# Patient Record
Sex: FEMALE | Race: WHITE | NOT HISPANIC OR LATINO | Employment: OTHER | URBAN - METROPOLITAN AREA
[De-identification: names, ages, dates, MRNs, and addresses within clinical notes are randomized per-mention and may not be internally consistent; named-entity substitution may affect disease eponyms.]

---

## 2018-09-21 ENCOUNTER — HOSPITAL ENCOUNTER (OUTPATIENT)
Dept: FAMILY MEDICINE CLINIC | Facility: CLINIC | Age: 73
Setting detail: SPECIMEN
Discharge: HOME | End: 2018-09-21
Attending: FAMILY MEDICINE | Admitting: FAMILY MEDICINE

## 2018-09-21 LAB
ALBUMIN SERPL-MCNC: 3.4 G/DL (ref 3.5–4.8)
ALBUMIN/GLOB SERPL: 1 {RATIO} (ref 1–1.7)
ALP SERPL-CCNC: 82 IU/L (ref 32–91)
ALT SERPL-CCNC: 8 IU/L (ref 14–54)
ANION GAP SERPL CALC-SCNC: 16 MMOL/L (ref 10–20)
AST SERPL-CCNC: 17 IU/L (ref 15–41)
BASOPHILS # BLD AUTO: 0 10*3/UL (ref 0–0.2)
BASOPHILS NFR BLD AUTO: 1 % (ref 0–2)
BILIRUB SERPL-MCNC: 0.7 MG/DL (ref 0.3–1.2)
BUN SERPL-MCNC: 24 MG/DL (ref 8–20)
BUN/CREAT SERPL: 17.1 (ref 5.4–26.2)
CALCIUM SERPL-MCNC: 9.8 MG/DL (ref 8.9–10.3)
CHLORIDE SERPL-SCNC: 95 MMOL/L (ref 101–111)
CHOLEST SERPL-MCNC: 207 MG/DL
CHOLEST/HDLC SERPL: 2.7 {RATIO}
CONV CO2: 23 MMOL/L (ref 22–32)
CONV LDL CHOLESTEROL DIRECT: 92 MG/DL (ref 0–100)
CONV TOTAL PROTEIN: 6.8 G/DL (ref 6.1–7.9)
CREAT UR-MCNC: 1.4 MG/DL (ref 0.4–1)
DIFFERENTIAL METHOD BLD: (no result)
EOSINOPHIL # BLD AUTO: 0.1 10*3/UL (ref 0–0.3)
EOSINOPHIL # BLD AUTO: 2 % (ref 0–3)
ERYTHROCYTE [DISTWIDTH] IN BLOOD BY AUTOMATED COUNT: 13.3 % (ref 11.5–14.5)
GLOBULIN UR ELPH-MCNC: 3.4 G/DL (ref 2.5–3.8)
GLUCOSE SERPL-MCNC: 93 MG/DL (ref 65–99)
HCT VFR BLD AUTO: 38 % (ref 35–49)
HDLC SERPL-MCNC: 78 MG/DL
HGB BLD-MCNC: 12.6 G/DL (ref 12–15)
LDLC/HDLC SERPL: 1.2 {RATIO}
LIPID INTERPRETATION: ABNORMAL
LYMPHOCYTES # BLD AUTO: 1.7 10*3/UL (ref 0.8–4.8)
LYMPHOCYTES NFR BLD AUTO: 25 % (ref 18–42)
MCH RBC QN AUTO: 29.6 PG (ref 26–32)
MCHC RBC AUTO-ENTMCNC: 33.2 G/DL (ref 32–36)
MCV RBC AUTO: 89.2 FL (ref 80–94)
MONOCYTES # BLD AUTO: 0.6 10*3/UL (ref 0.1–1.3)
MONOCYTES NFR BLD AUTO: 8 % (ref 2–11)
NEUTROPHILS # BLD AUTO: 4.6 10*3/UL (ref 2.3–8.6)
NEUTROPHILS NFR BLD AUTO: 64 % (ref 50–75)
NRBC BLD AUTO-RTO: 0 /100{WBCS}
NRBC/RBC NFR BLD MANUAL: 0 10*3/UL
PLATELET # BLD AUTO: 282 10*3/UL (ref 150–450)
PMV BLD AUTO: 7.3 FL (ref 7.4–10.4)
POTASSIUM SERPL-SCNC: 4 MMOL/L (ref 3.6–5.1)
RBC # BLD AUTO: 4.26 10*6/UL (ref 4–5.4)
SODIUM SERPL-SCNC: 130 MMOL/L (ref 136–144)
TRIGL SERPL-MCNC: 230 MG/DL
VLDLC SERPL CALC-MCNC: 37.7 MG/DL
WBC # BLD AUTO: 7.1 10*3/UL (ref 4.5–11.5)

## 2018-12-04 ENCOUNTER — HOSPITAL ENCOUNTER (OUTPATIENT)
Dept: FAMILY MEDICINE CLINIC | Facility: CLINIC | Age: 73
Setting detail: SPECIMEN
Discharge: HOME | End: 2018-12-04
Attending: FAMILY MEDICINE | Admitting: FAMILY MEDICINE

## 2018-12-04 LAB
ANION GAP SERPL CALC-SCNC: 12.5 MMOL/L (ref 10–20)
BACTERIA SPEC AEROBE CULT: NORMAL
BUN SERPL-MCNC: 17 MG/DL (ref 8–20)
BUN/CREAT SERPL: 15.5 (ref 5.4–26.2)
CALCIUM SERPL-MCNC: 9.3 MG/DL (ref 8.9–10.3)
CHLORIDE SERPL-SCNC: 97 MMOL/L (ref 101–111)
COLONY COUNT: NORMAL
CONV CO2: 24 MMOL/L (ref 22–32)
CREAT UR-MCNC: 1.1 MG/DL (ref 0.4–1)
GLUCOSE SERPL-MCNC: 95 MG/DL (ref 65–99)
Lab: NORMAL
MICRO REPORT STATUS: NORMAL
POTASSIUM SERPL-SCNC: 3.5 MMOL/L (ref 3.6–5.1)
SODIUM SERPL-SCNC: 130 MMOL/L (ref 136–144)
SPECIMEN SOURCE: NORMAL

## 2022-02-15 ENCOUNTER — OFFICE (AMBULATORY)
Dept: URBAN - METROPOLITAN AREA PATHOLOGY 4 | Facility: PATHOLOGY | Age: 77
End: 2022-02-15

## 2022-02-15 ENCOUNTER — ON CAMPUS - OUTPATIENT (AMBULATORY)
Dept: URBAN - METROPOLITAN AREA HOSPITAL 2 | Facility: HOSPITAL | Age: 77
End: 2022-02-15

## 2022-02-15 ENCOUNTER — OFFICE (AMBULATORY)
Dept: URBAN - METROPOLITAN AREA PATHOLOGY 4 | Facility: PATHOLOGY | Age: 77
End: 2022-02-15
Payer: COMMERCIAL

## 2022-02-15 VITALS
DIASTOLIC BLOOD PRESSURE: 71 MMHG | DIASTOLIC BLOOD PRESSURE: 86 MMHG | SYSTOLIC BLOOD PRESSURE: 137 MMHG | SYSTOLIC BLOOD PRESSURE: 148 MMHG | TEMPERATURE: 97.6 F | WEIGHT: 155 LBS | DIASTOLIC BLOOD PRESSURE: 104 MMHG | SYSTOLIC BLOOD PRESSURE: 154 MMHG | HEART RATE: 93 BPM | SYSTOLIC BLOOD PRESSURE: 152 MMHG | DIASTOLIC BLOOD PRESSURE: 94 MMHG | HEART RATE: 81 BPM | SYSTOLIC BLOOD PRESSURE: 161 MMHG | HEIGHT: 66 IN | SYSTOLIC BLOOD PRESSURE: 135 MMHG | DIASTOLIC BLOOD PRESSURE: 67 MMHG | OXYGEN SATURATION: 100 % | HEART RATE: 78 BPM | HEART RATE: 76 BPM | RESPIRATION RATE: 15 BRPM | HEART RATE: 71 BPM | DIASTOLIC BLOOD PRESSURE: 83 MMHG | RESPIRATION RATE: 14 BRPM | HEART RATE: 66 BPM | RESPIRATION RATE: 16 BRPM | SYSTOLIC BLOOD PRESSURE: 156 MMHG | DIASTOLIC BLOOD PRESSURE: 95 MMHG | RESPIRATION RATE: 18 BRPM | DIASTOLIC BLOOD PRESSURE: 66 MMHG | OXYGEN SATURATION: 99 % | SYSTOLIC BLOOD PRESSURE: 155 MMHG

## 2022-02-15 DIAGNOSIS — R19.5 OTHER FECAL ABNORMALITIES: ICD-10-CM

## 2022-02-15 DIAGNOSIS — K62.1 RECTAL POLYP: ICD-10-CM

## 2022-02-15 DIAGNOSIS — D12.8 BENIGN NEOPLASM OF RECTUM: ICD-10-CM

## 2022-02-15 LAB
GI HISTOLOGY: A. UNSPECIFIED: (no result)
GI HISTOLOGY: PDF REPORT: (no result)

## 2022-02-15 PROCEDURE — 88305 TISSUE EXAM BY PATHOLOGIST: CPT | Mod: 26 | Performed by: INTERNAL MEDICINE

## 2022-02-15 PROCEDURE — 45385 COLONOSCOPY W/LESION REMOVAL: CPT | Performed by: INTERNAL MEDICINE

## 2022-03-17 ENCOUNTER — OFFICE (AMBULATORY)
Dept: URBAN - METROPOLITAN AREA CLINIC 64 | Facility: CLINIC | Age: 77
End: 2022-03-17

## 2022-03-17 VITALS
HEIGHT: 66 IN | SYSTOLIC BLOOD PRESSURE: 143 MMHG | HEART RATE: 65 BPM | DIASTOLIC BLOOD PRESSURE: 98 MMHG | WEIGHT: 163 LBS

## 2022-03-17 DIAGNOSIS — R10.84 GENERALIZED ABDOMINAL PAIN: ICD-10-CM

## 2022-03-17 PROCEDURE — 99213 OFFICE O/P EST LOW 20 MIN: CPT | Performed by: INTERNAL MEDICINE

## 2024-10-01 NOTE — PROGRESS NOTES
"Subjective   Patient ID: Renate Williamson is a 78 y.o. female is being seen for consultation today at the request of Monica Ko NP for Arthrodesis status, NO CURRENT IMAGING     History of Present Illness    She presents to the office today with pain in the left low back and hip that radiates down the left lateral leg to the shin.  Intermittently, pain will go all the way down the leg into the lateral foot.  It worsens with prolonged standing or walking.  She also has worsening discomfort with position changes, such as when going from sitting to standing.  She also has lots of difficulty sleeping as any direct pressure on the left hip results in a lot of pain.  It wakes her up at night.  She has not done any physical therapy.  She has had no imaging of her low back or hip.  She does feel weaker in the left leg.  She feels that she is not stable and balanced as she used to be, but she denies any falls.  She received a cortisone injection in the low back at her PCPs office, which did help with a lot of her hip discomfort, but some of it is starting to return.    She states that she can walk without significant discomfort, but the first few steps can be quite painful.  She states that her hip pain can be a 6 out of 10, sometimes higher depending on her activity level.  She takes no medication for pain.  She was taking some Tylenol.  She is unable to take NSAIDs due to chronic kidney dysfunction.  She denies any problems with the right side including the hip and leg.  There is no numbness and tingling in either leg.  She presents unaccompanied.              The following portions of the patient's history were reviewed and updated as appropriate: allergies, current medications, past family history, past medical history, past social history, past surgical history, and problem list.    Review of Systems    /70   Pulse 78   Temp 97.8 °F (36.6 °C)   Ht 170 cm (66.93\")   Wt 71.2 kg (157 lb)   SpO2 99%   BMI " "24.64 kg/m²       Objective     Vitals:    10/07/24 1436   BP: 110/70   Pulse: 78   Temp: 97.8 °F (36.6 °C)   SpO2: 99%   Weight: 71.2 kg (157 lb)   Height: 170 cm (66.93\")     Body mass index is 24.64 kg/m².      Physical Exam  Vitals reviewed.   Constitutional:       Appearance: Normal appearance.   HENT:      Head: Atraumatic.   Pulmonary:      Effort: Pulmonary effort is normal.   Musculoskeletal:         General: Tenderness (Very tender in the left SI joint and left lateral hip) present. Normal range of motion.      Right lower leg: No edema.      Left lower leg: No edema.      Comments: Full lumbar range of motion without pain   strength equal and intact bilateral lower extremities with normal muscle bulk and tone  Positive Pradip's on the left, negative on the right   Skin:     General: Skin is warm and dry.   Neurological:      Mental Status: She is alert and oriented to person, place, and time.      GCS: GCS eye subscore is 4. GCS verbal subscore is 5. GCS motor subscore is 6.      Sensory: No sensory deficit.      Motor: No weakness or tremor.      Gait: Gait abnormal (Gait is stable and upright, slightly antalgic on the left).      Deep Tendon Reflexes:      Reflex Scores:       Patellar reflexes are 2+ on the right side and 2+ on the left side.       Achilles reflexes are 2+ on the right side and 2+ on the left side.     Comments: Able to stand on heels and toes  Negative straight leg raise  Normal sensation to soft touch bilateral lower extremities   Psychiatric:         Mood and Affect: Mood normal.       Neurological Exam  Mental Status  Alert. Oriented to person, place, and time.    Reflexes                                            Right                      Left  Patellar                                2+                         2+  Achilles                                2+                         2+    Coordination  No tremor    Gait   Abnormal gait (Gait is stable and upright, slightly " antalgic on the left).          Assessment & Plan   Independent Review of Radiographic Studies:      I personally reviewed the images from the following studies.    No new imaging    Medical Decision Making:      Presents office today for further evaluation of left-sided low back, hip and radiating left leg pain.  Exam as noted above, no red flags.  I have ordered lumbar MRI imaging for further evaluation of the radiating leg pain which appears to be in the L4 distribution.  I have also ordered lumbar flexion and extension x-rays.  She had a positive Pradip's on the left and have also ordered hip x-ray imaging.  I have referred her to physical therapy to help with the left-sided low back and hip pain, as well as to help with her balance and gait concerns.  We will see her back in the office once the imaging studies are complete.  I ordered her MRI without contrast given her chronic kidney issues.  I explained that we typically order imaging with contrast if you have had prior lumbar surgery.  She underwent L5-S1 lumbar decompression and fusion with Dr. Robin approximately 14 years ago and did very well from that procedure.      Plan: Referral to PT, lumbar MRI, lumbar and hip x-ray imaging, return to office following.  No prescriptions were given to the patient today.    Diagnoses and all orders for this visit:    1. Chronic left-sided low back pain with left-sided sciatica (Primary)  -     MRI Lumbar Spine Without Contrast; Future  -     XR Spine Lumbar Complete With Flex & Ext  -     XR hip w or wo pelvis 2-3 view left; Future  -     Ambulatory Referral to Physical Therapy for Evaluation & Treatment    2. Left hip pain  -     MRI Lumbar Spine Without Contrast; Future  -     XR Spine Lumbar Complete With Flex & Ext  -     XR hip w or wo pelvis 2-3 view left; Future  -     Ambulatory Referral to Physical Therapy for Evaluation & Treatment    3. Gait instability  -     MRI Lumbar Spine Without Contrast; Future  -      XR Spine Lumbar Complete With Flex & Ext  -     XR hip w or wo pelvis 2-3 view left; Future  -     Ambulatory Referral to Physical Therapy for Evaluation & Treatment      Return in about 6 weeks (around 11/18/2024).

## 2024-10-07 ENCOUNTER — OFFICE VISIT (OUTPATIENT)
Dept: NEUROSURGERY | Facility: CLINIC | Age: 79
End: 2024-10-07
Payer: MEDICARE

## 2024-10-07 VITALS
SYSTOLIC BLOOD PRESSURE: 110 MMHG | DIASTOLIC BLOOD PRESSURE: 70 MMHG | HEART RATE: 78 BPM | OXYGEN SATURATION: 99 % | WEIGHT: 157 LBS | TEMPERATURE: 97.8 F | BODY MASS INDEX: 24.64 KG/M2 | HEIGHT: 67 IN

## 2024-10-07 DIAGNOSIS — G89.29 CHRONIC LEFT-SIDED LOW BACK PAIN WITH LEFT-SIDED SCIATICA: Primary | ICD-10-CM

## 2024-10-07 DIAGNOSIS — M54.42 CHRONIC LEFT-SIDED LOW BACK PAIN WITH LEFT-SIDED SCIATICA: Primary | ICD-10-CM

## 2024-10-07 DIAGNOSIS — M25.552 LEFT HIP PAIN: ICD-10-CM

## 2024-10-07 DIAGNOSIS — R26.81 GAIT INSTABILITY: ICD-10-CM

## 2024-10-07 PROCEDURE — 1160F RVW MEDS BY RX/DR IN RCRD: CPT | Performed by: NURSE PRACTITIONER

## 2024-10-07 PROCEDURE — 1159F MED LIST DOCD IN RCRD: CPT | Performed by: NURSE PRACTITIONER

## 2024-10-07 PROCEDURE — 99204 OFFICE O/P NEW MOD 45 MIN: CPT | Performed by: NURSE PRACTITIONER

## 2024-10-07 RX ORDER — DIPHENOXYLATE HYDROCHLORIDE AND ATROPINE SULFATE 2.5; .025 MG/1; MG/1
TABLET ORAL
COMMUNITY

## 2024-10-07 RX ORDER — TEMAZEPAM 15 MG/1
CAPSULE ORAL
COMMUNITY

## 2024-10-07 RX ORDER — LANOLIN ALCOHOL/MO/W.PET/CERES
CREAM (GRAM) TOPICAL EVERY 24 HOURS
COMMUNITY
Start: 2018-08-16

## 2024-10-07 RX ORDER — FENOFIBRATE 145 MG/1
TABLET, COATED ORAL
COMMUNITY
Start: 2024-09-16

## 2024-10-07 RX ORDER — PRAVASTATIN SODIUM 20 MG
TABLET ORAL
COMMUNITY
Start: 2012-07-30

## 2024-10-31 ENCOUNTER — HOSPITAL ENCOUNTER (OUTPATIENT)
Dept: GENERAL RADIOLOGY | Facility: HOSPITAL | Age: 79
Discharge: HOME | End: 2024-10-31
Payer: MEDICARE

## 2024-10-31 ENCOUNTER — HOSPITAL ENCOUNTER (OUTPATIENT)
Dept: MRI IMAGING | Facility: HOSPITAL | Age: 79
Discharge: HOME | End: 2024-10-31
Payer: MEDICARE

## 2024-10-31 DIAGNOSIS — M25.552 LEFT HIP PAIN: ICD-10-CM

## 2024-10-31 DIAGNOSIS — G89.29 CHRONIC LEFT-SIDED LOW BACK PAIN WITH LEFT-SIDED SCIATICA: ICD-10-CM

## 2024-10-31 DIAGNOSIS — R26.81 GAIT INSTABILITY: ICD-10-CM

## 2024-10-31 DIAGNOSIS — M54.42 CHRONIC LEFT-SIDED LOW BACK PAIN WITH LEFT-SIDED SCIATICA: ICD-10-CM

## 2024-10-31 PROCEDURE — 72148 MRI LUMBAR SPINE W/O DYE: CPT

## 2024-10-31 PROCEDURE — 72114 X-RAY EXAM L-S SPINE BENDING: CPT

## 2024-10-31 PROCEDURE — 73502 X-RAY EXAM HIP UNI 2-3 VIEWS: CPT

## 2024-11-14 NOTE — PROGRESS NOTES
"Subjective   Patient ID: Renate Williamson is a 79 y.o. female is here today for follow-up for   Mri lumbar spine on 10-.   [facility]         History of Present Illness    She was last seen in office on October 7, 2024 for further evaluation of left-sided low back, hip and radiating left leg pain.  She has undergone new lumbar MRI imaging and x-ray for further evaluation.  She was referred to physical therapy and also underwent hip imaging due to left lateral hip discomfort.  She has history of L5-S1 lumbar decompression and fusion approximately 14 years ago.    She reports that she is overall doing and feeling pretty well.  She does continue to have pain in the left hip and left leg that radiates down the leg.  Overall, she reports that her pain now is not near as bad as it was 14 years ago when she had her fusion surgery.  She denies any balance or gait changes.  She does continue to have some discomfort on the right hip as well.  She has not started physical therapy.  She denies any other problems or concerns today.  She presents unaccompanied.            The following portions of the patient's history were reviewed and updated as appropriate: allergies, current medications, past family history, past medical history, past social history, past surgical history, and problem list.    Review of Systems   Musculoskeletal:  Positive for back pain.   All other systems reviewed and are negative.        /70   Pulse 79   Temp 97.1 °F (36.2 °C)   Ht 170 cm (66.93\")   Wt 72.1 kg (159 lb)   SpO2 100%   BMI 24.96 kg/m²       Objective     Vitals:    11/21/24 1319   BP: 110/70   Pulse: 79   Temp: 97.1 °F (36.2 °C)   SpO2: 100%   Weight: 72.1 kg (159 lb)   Height: 170 cm (66.93\")     Body mass index is 24.96 kg/m².      Physical Exam  Vitals reviewed.   Constitutional:       Appearance: Normal appearance.   HENT:      Head: Atraumatic.   Pulmonary:      Effort: Pulmonary effort is normal. "   Musculoskeletal:         General: Normal range of motion.      Comments: Moving all extremities well, strength appears equal and intact throughout   Skin:     General: Skin is warm and dry.   Neurological:      Mental Status: She is alert and oriented to person, place, and time.      GCS: GCS eye subscore is 4. GCS verbal subscore is 5. GCS motor subscore is 6.      Sensory: No sensory deficit.      Motor: No weakness or tremor.      Gait: Gait normal.      Comments: Gait is stable and upright, nonantalgic   Psychiatric:         Mood and Affect: Mood normal.       Neurological Exam  Mental Status  Alert. Oriented to person, place, and time.    Coordination  No tremor    Gait   Normal gait.          Assessment & Plan   Independent Review of Radiographic Studies:      I personally reviewed the images from the following studies.    Lumbar MRI without contrast from [facility] dated October 31, 2024 reveals posterior and interbody fusion at L5-S1 with moderate left neuroforaminal narrowing secondary to endplate osteophyte formation and facet arthropathy.  Degenerative disc disease and facet arthropathy at L4-5 with mild canal and bilateral neuroforaminal narrowing.  At L4-5, there is a 5 mm synovial cyst arising from the anterior medial aspect of the left facet joint.    Lumbar flexion and extension x-rays from [facility] dated October 31, 2024 reveal postsurgical changes at L5-S1 without hardware complication.  There is no anatomic instability.  No evidence of fracture or compression deformity.    Left hip x-ray shows mild joint space narrowing and mild arthritis of the left hip.    Medical Decision Making:      She returns to the office today for ongoing follow-up with history of left-sided leg and hip pain.  Exam as noted above, no red flags.   She is overall doing well.  I reviewed the imaging at length with the patient.  The hip x-ray only shows mild joint space narrowing and mild arthritis.  Lumbar flexion  and extension x-rays show no anatomic instability.  The lumbar MRI without contrast reveals posterior and interbody fusion at L5-S1.  There is a synovial cyst at L4-5 arising from the anterior medial aspect of the left facet joint.  This there is moderate left neuroforaminal narrowing secondary to endplate osteophyte formation.  These findings would certainly be contributing to her left leg pain.  I suggested the patient try oral anti-inflammatories and or an epidural injection.  At this time, she would like to move forward with physical therapy.  Have given her a new PT order.  If this does not help her symptoms she will consider the other medications and injection.  From our standpoint she is stable.  We will see her back in the office on an as-needed basis.  She will call back with any questions or concerns.    Plan: Return to office as needed    Diagnoses and all orders for this visit:    1. Chronic left-sided low back pain with left-sided sciatica (Primary)  -     Ambulatory Referral to Physical Therapy for Evaluation & Treatment    2. Left hip pain  -     Ambulatory Referral to Physical Therapy for Evaluation & Treatment      Return if symptoms worsen or fail to improve.

## 2024-11-21 ENCOUNTER — OFFICE VISIT (OUTPATIENT)
Dept: NEUROSURGERY | Facility: CLINIC | Age: 79
End: 2024-11-21
Payer: MEDICARE

## 2024-11-21 VITALS
DIASTOLIC BLOOD PRESSURE: 70 MMHG | HEART RATE: 79 BPM | WEIGHT: 159 LBS | TEMPERATURE: 97.1 F | OXYGEN SATURATION: 100 % | BODY MASS INDEX: 24.96 KG/M2 | HEIGHT: 67 IN | SYSTOLIC BLOOD PRESSURE: 110 MMHG

## 2024-11-21 DIAGNOSIS — G89.29 CHRONIC LEFT-SIDED LOW BACK PAIN WITH LEFT-SIDED SCIATICA: Primary | ICD-10-CM

## 2024-11-21 DIAGNOSIS — M54.42 CHRONIC LEFT-SIDED LOW BACK PAIN WITH LEFT-SIDED SCIATICA: Primary | ICD-10-CM

## 2024-11-21 DIAGNOSIS — M25.552 LEFT HIP PAIN: ICD-10-CM

## 2024-12-11 ENCOUNTER — TREATMENT (OUTPATIENT)
Dept: PHYSICAL THERAPY | Facility: CLINIC | Age: 79
End: 2024-12-11
Payer: MEDICARE

## 2024-12-11 DIAGNOSIS — M25.552 PAIN OF LEFT HIP: ICD-10-CM

## 2024-12-11 DIAGNOSIS — G89.29 CHRONIC BILATERAL LOW BACK PAIN WITH LEFT-SIDED SCIATICA: Primary | ICD-10-CM

## 2024-12-11 DIAGNOSIS — R53.1 WEAKNESS GENERALIZED: ICD-10-CM

## 2024-12-11 DIAGNOSIS — M54.42 CHRONIC BILATERAL LOW BACK PAIN WITH LEFT-SIDED SCIATICA: Primary | ICD-10-CM

## 2024-12-11 DIAGNOSIS — R26.81 UNSTEADINESS ON FEET: ICD-10-CM

## 2024-12-11 NOTE — PROGRESS NOTES
Physical Therapy Initial Evaluation and Plan of Care  [address] [place] [address] [place], [place] [address]    Patient: Renate Williamson   : 1945  Diagnosis/ICD-10 Code:  Chronic bilateral low back pain with left-sided sciatica [M54.42, G89.29]  Referring practitioner: PATRICIO Dorantes  Date of Initial Visit: 2024  Today's Date: 2024  Patient seen for 1 sessions           Subjective Questionnaire: PT Functional Test: Oswestry = 24% impairment;  Oxford Hip score = 38/48, indicating 79% ability and 21% impairment      Subjective Evaluation    History of Present Illness  Mechanism of injury: Pt with hx L5-S1 fusion ~14 yr ago. States she was doing really good up until now. Started having back pain around 3-4 mo ago and has progressively worsened. Started having pain down L LE at the same time also. States pain initially was at 6/10. She saw nurse practitioner at spine [facility] office and got a steroid shot. Not surgery candidate at this time and referred to PT. She was taking 2 tylenol arthritis in the AM and 2 in the PM and took x1 wk but stopped due to fatigue. L LE radicular pain comes and goes. L LE pain with sitting too long, climbing stairs, standing >10 min. Back pain comes and goes also. Increased back pain with washing dishes, emptying the . Ice and heat give some relief. No pain at rest.    Pt having L hip pain also. Cannot lay on L side very long due to hip pain. Has started having pain in R hip also from laying on it more. States she has pain that starts at side L hip that goes down to her knee.     Pt states her balance is not as good. Fell recently when a dog pulled and made her fall on 2024. States she is just getting over being sore from this. Has not helped with yard work in a couple years due to decreasing balance. Hx R tibia fx with ORIF .    Lumbar MRI 10/7/2024:  1. Posterior and interbody fusion at L5-S1 with moderate left neural foraminal narrowing suspected secondary to  endplate osteophyte formation and facet arthropathy.  2. Degenerative disc disease and facet arthropathy at L4-L5 with mild canal and bilateral neural foraminal narrowing.        Patient Occupation: retired Quality of life: good    Pain  Current pain ratin  At best pain ratin  At worst pain ratin  Quality: discomfort and radiating  Relieving factors: medications, relaxation, change in position, heat and ice  Aggravating factors: standing, ambulation, stairs, repetitive movement and lifting  Progression: worsening    Social Support  Lives in: multiple-level home  Lives with: spouse    Diagnostic Tests  MRI studies: abnormal    Patient Goals  Patient goals for therapy: decreased pain, improved balance, increased motion, increased strength and return to sport/leisure activities         Objective          Postural Observations    Additional Postural Observation Details  Good overall posture, B feet turned outward.    Gait: no device, good step length, slight decreased iliana, B out-toeing.    Palpation   Left   Hypertonic in the lumbar paraspinals.   Tenderness of the piriformis.     Tenderness     Left Hip   Tenderness in the greater trochanter.     Active Range of Motion     Additional Active Range of Motion Details  Lumbar AROM WFL with mild limitation in flexion.     B hip AROM WFL.    Strength/Myotome Testing     Left Hip   Planes of Motion   Flexion: 3+  Abduction: 4-    Right Hip   Planes of Motion   Flexion: 4-  Abduction: 4    Left Knee   Flexion: 4  Extension: 4    Right Knee   Flexion: 5  Extension: 5    Left Ankle/Foot   Dorsiflexion: 5    Right Ankle/Foot   Dorsiflexion: 5    Tests     Lumbar     Left   Negative passive SLR.     Lumbar Flexibility Comments:   Hamstring flexibility (90/90)  R = 50 deg  L = 50 deg    Mild tightness L piriformis and B hip flexors.        30 sec STS = 9 reps with B UE.    Assessment & Plan       Assessment  Impairments: abnormal or restricted ROM, activity  intolerance, impaired balance, impaired physical strength, lacks appropriate home exercise program, pain with function and safety issue   Functional limitations: carrying objects, lifting, sleeping, walking, uncomfortable because of pain, moving in bed, sitting and unable to perform repetitive tasks   Assessment details: Pt is 78 yo female with hx L5-S1 fusion (>10 yr ago) who presents to PT with c/o worsening back pain and intermittent L LE radicular symptoms. She is having difficulty standing >10 min, prolonged sitting, and climbing stairs. Pt also with L hip and decline in her balance. Not able to walk in her yard or help with yardwork Pt would benefit from skilled PT intervention to address the deficits noted. Oswestry indicates 24% impairment and [place] hip indicates 21% impairment.    Patient presents with the impairments listed above and based on the objective findings and the physical therapy evaluation, the patient's condition has the potential to improve in response to therapy.   The patient's condition and/or services required are at a level of complexity that necessitates the skill & supervision of a physical therapist.      Prognosis: good    Goals  Plan Goals: SHORT TERM GOALS: Time for Goal Achievement: 4 weeks  1.  Patient to be compliant and progression of HEP                             2.  Pain level < 4/10 at worst with mentioned activities to improve function  3.  No tenderness at L greater trochanter to allow for pt to lay on L side at night.    LONG TERM GOALS: Time for Goal Achievement: 12 weeks  1.  Pt. to score < 15 % on Back Index  2.  Improve B hamstring flexibility to 60 deg for improved ability to bend forward.   3.  Improve 30 sec STS to 10 reps without use of hands for ease getting up from lower surfaces.  4.  (B) LE and lower abdominal strength to 5/5 to allow for pushing, pulling and activities to occur without pain (driving, sitting, household requirements)        Plan  Therapy  options: will be seen for skilled therapy services  Planned modality interventions: cryotherapy, electrical stimulation/Russian stimulation, TENS, thermotherapy (hydrocollator packs), ultrasound and dry needling  Other planned modality interventions: modalities as indicated  Planned therapy interventions: body mechanics training, flexibility, functional ROM exercises, home exercise program, manual therapy, neuromuscular re-education, postural training, spinal/joint mobilization, stretching, strengthening, soft tissue mobilization, therapeutic activities, motor coordination training, joint mobilization, transfer training, abdominal trunk stabilization, balance/weight-bearing training and gait training  Frequency: 1x week  Duration in weeks: 12  Treatment plan discussed with: patient        Timed:         Manual Therapy:         mins  13222;     Therapeutic Exercise:    15     mins  34513;     Neuromuscular Damaris:        mins  53570;    Therapeutic Activity:          mins  17209;     Gait Training:           mins  47789;     Ultrasound:          mins  52964;    Ionto                                   mins   88327  Self - Care                     10     mins  47842    Un-Timed:  Electrical Stimulation:         mins  93181 ( );  Dry Needling          20561/59890  Traction          mins 67448  Can Repos          mins 78594  Low Eval          Mins  82303  Mod Eval     25     Mins  19940  High Eval                            Mins  20248      Timed Treatment:   25   mins   Total Treatment:     50   mins      PT SIGNATURE: Caroline De Jesus PT, CLT  [place] Lic # [id number]  Electronically signed by Caroline De Jesus PT, 12/11/24, 1:50 PM EST    Medicare Initial Certification  Certification Period: 12/11/2024 thru 3/10/2025  I certify that the therapy services are furnished while this patient is under my care.  The services outlined above are required by this patient, and will be reviewed every 90 days.     PHYSICIAN: Miki  PATRICIO Baldwin _____________________________________________________  NPI: [id number]                                      DATE:    Please sign and return via fax to [phone]. Thank you, [facility].

## 2024-12-17 ENCOUNTER — TREATMENT (OUTPATIENT)
Dept: PHYSICAL THERAPY | Facility: CLINIC | Age: 79
End: 2024-12-17
Payer: MEDICARE

## 2024-12-17 DIAGNOSIS — M54.42 CHRONIC BILATERAL LOW BACK PAIN WITH LEFT-SIDED SCIATICA: Primary | ICD-10-CM

## 2024-12-17 DIAGNOSIS — R53.1 WEAKNESS GENERALIZED: ICD-10-CM

## 2024-12-17 DIAGNOSIS — G89.29 CHRONIC BILATERAL LOW BACK PAIN WITH LEFT-SIDED SCIATICA: Primary | ICD-10-CM

## 2024-12-17 DIAGNOSIS — R26.81 UNSTEADINESS ON FEET: ICD-10-CM

## 2024-12-17 DIAGNOSIS — M25.552 PAIN OF LEFT HIP: ICD-10-CM

## 2024-12-17 PROCEDURE — 97110 THERAPEUTIC EXERCISES: CPT | Performed by: PHYSICAL THERAPIST

## 2024-12-17 PROCEDURE — 97112 NEUROMUSCULAR REEDUCATION: CPT | Performed by: PHYSICAL THERAPIST

## 2024-12-17 PROCEDURE — G0283 ELEC STIM OTHER THAN WOUND: HCPCS | Performed by: PHYSICAL THERAPIST

## 2024-12-17 PROCEDURE — 97530 THERAPEUTIC ACTIVITIES: CPT | Performed by: PHYSICAL THERAPIST

## 2024-12-17 NOTE — PROGRESS NOTES
Physical Therapy Daily Treatment Note      Patient: Renate Williamson   : 1945  Diagnosis/ICD-10 Code:  Chronic bilateral low back pain with left-sided sciatica [M54.42, G89.29]   Problems Addressed this Visit    None  Visit Diagnoses       Chronic bilateral low back pain with left-sided sciatica    -  Primary    Pain of left hip        Weakness generalized        Unsteadiness on feet              Diagnoses         Codes Comments    Chronic bilateral low back pain with left-sided sciatica    -  Primary ICD-10-CM: M54.42, G89.29  ICD-9-CM: 724.2, 724.3, 338.29     Pain of left hip     ICD-10-CM: M25.552  ICD-9-CM: 719.45     Weakness generalized     ICD-10-CM: R53.1  ICD-9-CM: 780.79     Unsteadiness on feet     ICD-10-CM: R26.81  ICD-9-CM: 781.2           Referring practitioner: PATRICIO Dorantes  Date of Initial Visit: Type: THERAPY  Noted: 2024  Today's Date: 2024    VISIT#: 2    Subjective : States low back is sore today. Has not used ice or heating pad over the past week.    Objective : changed seated piriformis stretch to leaning forward instead of pulling knee up.    Added seated dead lift with resistance band. Issued copy of exercise for HEP.     Trialed e-stim and MHP to lumbar region this date.  See Exercise, Manual, and Modality Logs for complete treatment.     Assessment/Plan : Increased pain at start of session. Good tolerance to ther ex progression. Good initial response to e-stim.     Progress strengthening /stabilization /functional activity      Timed:         Manual Therapy:    5     mins  58113;     Therapeutic Exercise:    15     mins  22320;     Neuromuscular Damaris:    8    mins  72412;    Therapeutic Activity:     15     mins  12369;     Gait Training:           mins  63921;     Ultrasound:          mins  09402;    Ionto                                   mins   15586  Self Care                            mins   28803    Un-Timed:  Electrical Stimulation:    15     mins  25291 (MOHINI  );  Dry Needling          mins 18191/12473  Traction          mins 72626  Canalith Repos                   mins  85483  Low Eval          Mins  70272  Mod Eval          Mins  28399  High Eval                            Mins  34146  Re-Eval                               mins  19266    Timed Treatment:   43   mins   Total Treatment:     58   mins    Caroline De Jesus PT, CLT  Physical Therapist  [place] Lic # [id number]

## 2024-12-23 ENCOUNTER — TREATMENT (OUTPATIENT)
Dept: PHYSICAL THERAPY | Facility: CLINIC | Age: 79
End: 2024-12-23
Payer: MEDICARE

## 2024-12-23 DIAGNOSIS — M54.42 CHRONIC BILATERAL LOW BACK PAIN WITH LEFT-SIDED SCIATICA: Primary | ICD-10-CM

## 2024-12-23 DIAGNOSIS — G89.29 CHRONIC BILATERAL LOW BACK PAIN WITH LEFT-SIDED SCIATICA: Primary | ICD-10-CM

## 2024-12-23 DIAGNOSIS — M25.552 PAIN OF LEFT HIP: ICD-10-CM

## 2024-12-23 DIAGNOSIS — R26.81 UNSTEADINESS ON FEET: ICD-10-CM

## 2024-12-23 DIAGNOSIS — R53.1 WEAKNESS GENERALIZED: ICD-10-CM

## 2024-12-23 PROCEDURE — 97110 THERAPEUTIC EXERCISES: CPT | Performed by: PHYSICAL THERAPIST

## 2024-12-23 PROCEDURE — 97140 MANUAL THERAPY 1/> REGIONS: CPT | Performed by: PHYSICAL THERAPIST

## 2024-12-23 PROCEDURE — G0283 ELEC STIM OTHER THAN WOUND: HCPCS | Performed by: PHYSICAL THERAPIST

## 2024-12-23 NOTE — PROGRESS NOTES
Physical Therapy Daily Treatment Note      Patient: Renate Williamson   : 1945  Diagnosis/ICD-10 Code:  Chronic bilateral low back pain with left-sided sciatica [M54.42, G89.29]   Problems Addressed this Visit    None  Visit Diagnoses       Chronic bilateral low back pain with left-sided sciatica    -  Primary    Pain of left hip        Weakness generalized        Unsteadiness on feet              Diagnoses         Codes Comments    Chronic bilateral low back pain with left-sided sciatica    -  Primary ICD-10-CM: M54.42, G89.29  ICD-9-CM: 724.2, 724.3, 338.29     Pain of left hip     ICD-10-CM: M25.552  ICD-9-CM: 719.45     Weakness generalized     ICD-10-CM: R53.1  ICD-9-CM: 780.79     Unsteadiness on feet     ICD-10-CM: R26.81  ICD-9-CM: 781.2           Referring practitioner: PATRICIO Dorantes  Date of Initial Visit: Type: THERAPY  Noted: 2024  Today's Date: 2024    VISIT#: 3    Subjective : Pt reports her L hip is still sore. Low back has been painful. States she has not had a great day, is just really tired.    Objective :     See Exercise, Manual, and Modality Logs for complete treatment.     Assessment/Plan : Continued low back and L hip pain with no significant change since last visit. Good tolerance to ther ex with no increase in symptoms.     Goals  Plan Goals: SHORT TERM GOALS: Time for Goal Achievement: 4 weeks  1.  Patient to be compliant and progression of HEP                             2.  Pain level < 4/10 at worst with mentioned activities to improve function  3.  No tenderness at L greater trochanter to allow for pt to lay on L side at night.     LONG TERM GOALS: Time for Goal Achievement: 12 weeks  1.  Pt. to score < 15 % on Back Index  2.  Improve B hamstring flexibility to 60 deg for improved ability to bend forward.   3.  Improve 30 sec STS to 10 reps without use of hands for ease getting up from lower surfaces.  4.  (B) LE and lower abdominal strength to 5/5 to allow for  pushing, pulling and activities to occur without pain (driving, sitting, household requirements)    Progress strengthening /stabilization /functional activity      Timed:         Manual Therapy:    10     mins  10017;     Therapeutic Exercise:    15     mins  39865;     Neuromuscular Damaris:        mins  06806;    Therapeutic Activity:     5     mins  08596;     Gait Training:           mins  50359;     Ultrasound:          mins  59756;    Ionto                                   mins   88171  Self Care                            mins   57001    Un-Timed:  Electrical Stimulation:    15     mins  37622 ( );  Dry Needling          mins 20561/20560  Traction          mins 34303  Canalith Repos                   mins  03286  Low Eval          Mins  15180  Mod Eval          Mins  19782  High Eval                            Mins  20927  Re-Eval                               mins  42894    Timed Treatment:   30   mins   Total Treatment:     45   mins    Caroline De Jesus PT, CLT  Physical Therapist  [place] Lic # [id number]

## 2024-12-26 ENCOUNTER — TELEPHONE (OUTPATIENT)
Dept: PHYSICAL THERAPY | Facility: CLINIC | Age: 79
End: 2024-12-26
Payer: MEDICARE

## 2024-12-26 NOTE — TELEPHONE ENCOUNTER
Caller: Renate Williamson    Relationship: Self         What was the call regarding: HAS COVID

## 2025-01-07 ENCOUNTER — TREATMENT (OUTPATIENT)
Dept: PHYSICAL THERAPY | Facility: CLINIC | Age: 80
End: 2025-01-07
Payer: MEDICARE

## 2025-01-07 DIAGNOSIS — G89.29 CHRONIC BILATERAL LOW BACK PAIN WITH LEFT-SIDED SCIATICA: Primary | ICD-10-CM

## 2025-01-07 DIAGNOSIS — R53.1 WEAKNESS GENERALIZED: ICD-10-CM

## 2025-01-07 DIAGNOSIS — M54.42 CHRONIC BILATERAL LOW BACK PAIN WITH LEFT-SIDED SCIATICA: Primary | ICD-10-CM

## 2025-01-07 DIAGNOSIS — M25.552 PAIN OF LEFT HIP: ICD-10-CM

## 2025-01-07 DIAGNOSIS — R26.81 UNSTEADINESS ON FEET: ICD-10-CM

## 2025-01-07 PROCEDURE — 97140 MANUAL THERAPY 1/> REGIONS: CPT | Performed by: PHYSICAL THERAPIST

## 2025-01-07 PROCEDURE — G0283 ELEC STIM OTHER THAN WOUND: HCPCS | Performed by: PHYSICAL THERAPIST

## 2025-01-07 PROCEDURE — 97112 NEUROMUSCULAR REEDUCATION: CPT | Performed by: PHYSICAL THERAPIST

## 2025-01-07 PROCEDURE — 97110 THERAPEUTIC EXERCISES: CPT | Performed by: PHYSICAL THERAPIST

## 2025-01-07 NOTE — PROGRESS NOTES
Physical Therapy Daily Treatment Note      Patient: Renate Williamson   : 1945  Diagnosis/ICD-10 Code:  Chronic bilateral low back pain with left-sided sciatica [M54.42, G89.29]   Problems Addressed this Visit    None  Visit Diagnoses       Chronic bilateral low back pain with left-sided sciatica    -  Primary    Pain of left hip        Weakness generalized        Unsteadiness on feet              Diagnoses         Codes Comments    Chronic bilateral low back pain with left-sided sciatica    -  Primary ICD-10-CM: M54.42, G89.29  ICD-9-CM: 724.2, 724.3, 338.29     Pain of left hip     ICD-10-CM: M25.552  ICD-9-CM: 719.45     Weakness generalized     ICD-10-CM: R53.1  ICD-9-CM: 780.79     Unsteadiness on feet     ICD-10-CM: R26.81  ICD-9-CM: 781.2           Referring practitioner: PATRICIO Dorantes  Date of Initial Visit: Type: THERAPY  Noted: 2024  Today's Date: 2025    VISIT#: 4    Subjective : Pt reports she tested positive for COVID after not feeling well at last visit. Feeling much better now with less achiness. Still having some L hip pain but better than it has been. Continued low back pain.    Objective : added clamshells, leg press and NuStep this date.     See Exercise, Manual, and Modality Logs for complete treatment.     Assessment/Plan : Tenderness L greater trochanter. Good tolerance to ther ex progression. Pt needs continued core and B LE strengthening to further decrease pain and improve tolerance to all functional tasks.    Goals  Plan Goals: SHORT TERM GOALS: Time for Goal Achievement: 4 weeks  1.  Patient to be compliant and progression of HEP                             2.  Pain level < 4/10 at worst with mentioned activities to improve function  3.  No tenderness at L greater trochanter to allow for pt to lay on L side at night.     LONG TERM GOALS: Time for Goal Achievement: 12 weeks  1.  Pt. to score < 15 % on Back Index  2.  Improve B hamstring flexibility to 60 deg for improved  ability to bend forward.   3.  Improve 30 sec STS to 10 reps without use of hands for ease getting up from lower surfaces.  4.  (B) LE and lower abdominal strength to 5/5 to allow for pushing, pulling and activities to occur without pain (driving, sitting, household requirements)    Progress per Plan of Care and Progress strengthening /stabilization /functional activity      Timed:         Manual Therapy:    10     mins  65411;     Therapeutic Exercise:    15     mins  40264;     Neuromuscular Damaris:    13    mins  80772;    Therapeutic Activity:          mins  65444;     Gait Training:           mins  87131;     Ultrasound:          mins  18398;    Ionto                                   mins   22027  Self Care                            mins   07952    Un-Timed:  Electrical Stimulation:    15     mins  62570 ( );  Dry Needling          mins 48997/28483  Traction          mins 67762  Canalith Repos                   mins  04572  Low Eval          Mins  23076  Mod Eval          Mins  28467  High Eval                            Mins  21426  Re-Eval                               mins  87944    Timed Treatment:   38   mins   Total Treatment:     53   mins    Caroline De Jesus PT, CLT  Physical Therapist  [place] Lic # [id number]

## 2025-01-14 ENCOUNTER — TREATMENT (OUTPATIENT)
Dept: PHYSICAL THERAPY | Facility: CLINIC | Age: 80
End: 2025-01-14
Payer: MEDICARE

## 2025-01-14 DIAGNOSIS — R26.81 UNSTEADINESS ON FEET: ICD-10-CM

## 2025-01-14 DIAGNOSIS — G89.29 CHRONIC BILATERAL LOW BACK PAIN WITH LEFT-SIDED SCIATICA: Primary | ICD-10-CM

## 2025-01-14 DIAGNOSIS — R53.1 WEAKNESS GENERALIZED: ICD-10-CM

## 2025-01-14 DIAGNOSIS — M25.552 PAIN OF LEFT HIP: ICD-10-CM

## 2025-01-14 DIAGNOSIS — M54.42 CHRONIC BILATERAL LOW BACK PAIN WITH LEFT-SIDED SCIATICA: Primary | ICD-10-CM

## 2025-01-14 PROCEDURE — G0283 ELEC STIM OTHER THAN WOUND: HCPCS | Performed by: PHYSICAL THERAPIST

## 2025-01-14 PROCEDURE — 97112 NEUROMUSCULAR REEDUCATION: CPT | Performed by: PHYSICAL THERAPIST

## 2025-01-14 PROCEDURE — 97140 MANUAL THERAPY 1/> REGIONS: CPT | Performed by: PHYSICAL THERAPIST

## 2025-01-14 PROCEDURE — 97110 THERAPEUTIC EXERCISES: CPT | Performed by: PHYSICAL THERAPIST

## 2025-01-14 NOTE — PROGRESS NOTES
Physical Therapy Daily Treatment Note      Patient: Renate Williamson   : 1945  Diagnosis/ICD-10 Code:  Chronic bilateral low back pain with left-sided sciatica [M54.42, G89.29]   Problems Addressed this Visit    None  Visit Diagnoses       Chronic bilateral low back pain with left-sided sciatica    -  Primary    Pain of left hip        Weakness generalized        Unsteadiness on feet              Diagnoses         Codes Comments    Chronic bilateral low back pain with left-sided sciatica    -  Primary ICD-10-CM: M54.42, G89.29  ICD-9-CM: 724.2, 724.3, 338.29     Pain of left hip     ICD-10-CM: M25.552  ICD-9-CM: 719.45     Weakness generalized     ICD-10-CM: R53.1  ICD-9-CM: 780.79     Unsteadiness on feet     ICD-10-CM: R26.81  ICD-9-CM: 781.2           Referring practitioner: PATRICIO Dorantes  Date of Initial Visit: Type: THERAPY  Noted: 2024  Today's Date: 2025    VISIT#: 5    Subjective : Pt reports she is feeling good. Having discomfort at hips, no longer pain. Had muscle soreness for ~1 day after last visit.    Objective :     See Exercise, Manual, and Modality Logs for complete treatment.     Assessment/Plan : Muscle soreness after ther ex progression at last visit. Good tolerance to ther ex this date with no progression done. Decreased L hip pain. Pt responding well to treatment.    Progress per Plan of Care and Progress strengthening /stabilization /functional activity      Timed:         Manual Therapy:    10     mins  36995;     Therapeutic Exercise:    15     mins  42965;     Neuromuscular Damaris:    13    mins  60599;    Therapeutic Activity:          mins  72190;     Gait Training:           mins  72189;     Ultrasound:          mins  25559;    Ionto                                   mins   07043  Self Care                            mins   02084    Un-Timed:  Electrical Stimulation:    15     mins  45742 ( );  Dry Needling          mins /  Traction          mins  95661  Canalith Repos                   mins  36596  Low Eval          Mins  35435  Mod Eval          Mins  89866  High Eval                            Mins  90156  Re-Eval                               mins  48912    Timed Treatment:   38   mins   Total Treatment:     53   mins    Caroline De Jesus PT, CLT  Physical Therapist  [place] Lic # [id number]

## 2025-01-21 ENCOUNTER — TREATMENT (OUTPATIENT)
Dept: PHYSICAL THERAPY | Facility: CLINIC | Age: 80
End: 2025-01-21
Payer: MEDICARE

## 2025-01-21 DIAGNOSIS — R26.81 UNSTEADINESS ON FEET: ICD-10-CM

## 2025-01-21 DIAGNOSIS — R53.1 WEAKNESS GENERALIZED: ICD-10-CM

## 2025-01-21 DIAGNOSIS — M54.42 CHRONIC BILATERAL LOW BACK PAIN WITH LEFT-SIDED SCIATICA: Primary | ICD-10-CM

## 2025-01-21 DIAGNOSIS — M25.552 PAIN OF LEFT HIP: ICD-10-CM

## 2025-01-21 DIAGNOSIS — G89.29 CHRONIC BILATERAL LOW BACK PAIN WITH LEFT-SIDED SCIATICA: Primary | ICD-10-CM

## 2025-01-21 PROCEDURE — 97140 MANUAL THERAPY 1/> REGIONS: CPT | Performed by: PHYSICAL THERAPIST

## 2025-01-21 PROCEDURE — G0283 ELEC STIM OTHER THAN WOUND: HCPCS | Performed by: PHYSICAL THERAPIST

## 2025-01-21 PROCEDURE — 97110 THERAPEUTIC EXERCISES: CPT | Performed by: PHYSICAL THERAPIST

## 2025-01-21 PROCEDURE — 97112 NEUROMUSCULAR REEDUCATION: CPT | Performed by: PHYSICAL THERAPIST

## 2025-01-21 NOTE — PROGRESS NOTES
Physical Therapy Daily Treatment Note      Patient: Renate Williamson   : 1945  Diagnosis/ICD-10 Code:  Chronic bilateral low back pain with left-sided sciatica [M54.42, G89.29]   Problems Addressed this Visit    None  Visit Diagnoses       Chronic bilateral low back pain with left-sided sciatica    -  Primary    Pain of left hip        Weakness generalized        Unsteadiness on feet              Diagnoses         Codes Comments    Chronic bilateral low back pain with left-sided sciatica    -  Primary ICD-10-CM: M54.42, G89.29  ICD-9-CM: 724.2, 724.3, 338.29     Pain of left hip     ICD-10-CM: M25.552  ICD-9-CM: 719.45     Weakness generalized     ICD-10-CM: R53.1  ICD-9-CM: 780.79     Unsteadiness on feet     ICD-10-CM: R26.81  ICD-9-CM: 781.2           Referring practitioner: PATRICIO Dorantes  Date of Initial Visit: Type: THERAPY  Noted: 2024  Today's Date: 2025    VISIT#: 6    Subjective : Pt states her L leg is doing better. Her back is an ongoing issue.    Objective : Increased leg press to 75#.    See Exercise, Manual, and Modality Logs for complete treatment.     Assessment/Plan : Good tolerance to ther ex this date with increased resistance on leg press. Decreased L hip pain. Pt responding well to treatment.     Goals  Plan Goals: SHORT TERM GOALS: Time for Goal Achievement: 4 weeks  1.  Patient to be compliant and progression of HEP - MET                            2.  Pain level < 4/10 at worst with mentioned activities to improve function - Progressing  3.  No tenderness at L greater trochanter to allow for pt to lay on L side at night. - Progressing     LONG TERM GOALS: Time for Goal Achievement: 12 weeks  1.  Pt. to score < 15 % on Back Index  2.  Improve B hamstring flexibility to 60 deg for improved ability to bend forward.   3.  Improve 30 sec STS to 10 reps without use of hands for ease getting up from lower surfaces.  4.  (B) LE and lower abdominal strength to 5/5 to allow for  pushing, pulling and activities to occur without pain (driving, sitting, household requirements)    Progress per Plan of Care and Progress strengthening /stabilization /functional activity         Timed:         Manual Therapy:    10     mins  07255;     Therapeutic Exercise:    15     mins  44758;     Neuromuscular Damaris:    13    mins  46216;    Therapeutic Activity:          mins  81305;     Gait Training:           mins  99108;     Ultrasound:          mins  98467;    Ionto                                   mins   67506  Self Care                            mins   34842    Un-Timed:  Electrical Stimulation:    15     mins  62076 ( );  Dry Needling          mins 20561/20560  Traction          mins 71844  Canalith Repos                   mins  91521  Low Eval          Mins  18964  Mod Eval          Mins  09033  High Eval                            Mins  09556  Re-Eval                               mins  59397    Timed Treatment:   38   mins   Total Treatment:     53   mins    Caroline De Jesus PT, CLT  Physical Therapist  [place] Lic # [id number]

## 2025-01-28 ENCOUNTER — TREATMENT (OUTPATIENT)
Dept: PHYSICAL THERAPY | Facility: CLINIC | Age: 80
End: 2025-01-28
Payer: MEDICARE

## 2025-01-28 DIAGNOSIS — M25.552 PAIN OF LEFT HIP: ICD-10-CM

## 2025-01-28 DIAGNOSIS — M54.42 CHRONIC BILATERAL LOW BACK PAIN WITH LEFT-SIDED SCIATICA: Primary | ICD-10-CM

## 2025-01-28 DIAGNOSIS — R26.81 UNSTEADINESS ON FEET: ICD-10-CM

## 2025-01-28 DIAGNOSIS — R53.1 WEAKNESS GENERALIZED: ICD-10-CM

## 2025-01-28 DIAGNOSIS — G89.29 CHRONIC BILATERAL LOW BACK PAIN WITH LEFT-SIDED SCIATICA: Primary | ICD-10-CM

## 2025-01-28 NOTE — PROGRESS NOTES
Physical Therapy Daily Treatment Note      Patient: Renate Williamson   : 1945  Diagnosis/ICD-10 Code:  Chronic bilateral low back pain with left-sided sciatica [M54.42, G89.29]   Problems Addressed this Visit    None  Visit Diagnoses       Chronic bilateral low back pain with left-sided sciatica    -  Primary    Pain of left hip        Weakness generalized        Unsteadiness on feet              Diagnoses         Codes Comments    Chronic bilateral low back pain with left-sided sciatica    -  Primary ICD-10-CM: M54.42, G89.29  ICD-9-CM: 724.2, 724.3, 338.29     Pain of left hip     ICD-10-CM: M25.552  ICD-9-CM: 719.45     Weakness generalized     ICD-10-CM: R53.1  ICD-9-CM: 780.79     Unsteadiness on feet     ICD-10-CM: R26.81  ICD-9-CM: 781.2           Referring practitioner: PATRICIO Dorantes  Date of Initial Visit: Type: THERAPY  Noted: 2024  Today's Date: 2025    VISIT#: 7    Subjective : pt reports she has been lazy and has not done much. States L hip is doing ok. It did wake her up one morning. Low back gets tired when she is cooking and washing dishes.     Objective : added isometric core work and dead bug    See Exercise, Manual, and Modality Logs for complete treatment.     Assessment/Plan: Decreased L hip pain. Continues to have low back pain that limits standing tolerance when working in the kitchen. Good tolerance to ther ex progression. Needs continued core and B hip strengthening to decrease pain and improve tolerance to all functional tasks.    Goals  Plan Goals: SHORT TERM GOALS: Time for Goal Achievement: 4 weeks  1.  Patient to be compliant and progression of HEP - MET                            2.  Pain level < 4/10 at worst with mentioned activities to improve function - Progressing  3.  No tenderness at L greater trochanter to allow for pt to lay on L side at night. - Progressing     LONG TERM GOALS: Time for Goal Achievement: 12 weeks  1.  Pt. to score < 15 % on Back Index  2.   Improve B hamstring flexibility to 60 deg for improved ability to bend forward.   3.  Improve 30 sec STS to 10 reps without use of hands for ease getting up from lower surfaces.  4.  (B) LE and lower abdominal strength to 5/5 to allow for pushing, pulling and activities to occur without pain (driving, sitting, household requirements)    Progress per Plan of Care and Progress strengthening /stabilization /functional activity      Timed:         Manual Therapy:    10     mins  99239;     Therapeutic Exercise:    15     mins  44551;     Neuromuscular Damaris:    13    mins  36818;    Therapeutic Activity:          mins  68678;     Gait Training:           mins  87028;     Ultrasound:          mins  49165;    Ionto                                   mins   32343  Self Care                            mins   80623    Un-Timed:  Electrical Stimulation:     15    mins  62097 ( );  Dry Needling          mins 22326/11210  Traction          mins 73757  Canalith Repos                   mins  42969  Low Eval          Mins  00409  Mod Eval          Mins  15467  High Eval                            Mins  68579  Re-Eval                               mins  01108    Timed Treatment:   38   mins   Total Treatment:     53   mins    Caroline De Jesus PT, CLT  Physical Therapist  [place] Lic # [id number]

## 2025-02-04 ENCOUNTER — TREATMENT (OUTPATIENT)
Dept: PHYSICAL THERAPY | Facility: CLINIC | Age: 80
End: 2025-02-04
Payer: MEDICARE

## 2025-02-04 DIAGNOSIS — R26.81 UNSTEADINESS ON FEET: ICD-10-CM

## 2025-02-04 DIAGNOSIS — M25.552 PAIN OF LEFT HIP: ICD-10-CM

## 2025-02-04 DIAGNOSIS — R53.1 WEAKNESS GENERALIZED: ICD-10-CM

## 2025-02-04 DIAGNOSIS — M54.42 CHRONIC BILATERAL LOW BACK PAIN WITH LEFT-SIDED SCIATICA: Primary | ICD-10-CM

## 2025-02-04 DIAGNOSIS — G89.29 CHRONIC BILATERAL LOW BACK PAIN WITH LEFT-SIDED SCIATICA: Primary | ICD-10-CM

## 2025-02-04 NOTE — PROGRESS NOTES
Physical Therapy Daily Treatment Note      Patient: Renate Williamson   : 1945  Diagnosis/ICD-10 Code:  Chronic bilateral low back pain with left-sided sciatica [M54.42, G89.29]   Problems Addressed this Visit    None  Visit Diagnoses       Chronic bilateral low back pain with left-sided sciatica    -  Primary    Pain of left hip        Weakness generalized        Unsteadiness on feet              Diagnoses         Codes Comments    Chronic bilateral low back pain with left-sided sciatica    -  Primary ICD-10-CM: M54.42, G89.29  ICD-9-CM: 724.2, 724.3, 338.29     Pain of left hip     ICD-10-CM: M25.552  ICD-9-CM: 719.45     Weakness generalized     ICD-10-CM: R53.1  ICD-9-CM: 780.79     Unsteadiness on feet     ICD-10-CM: R26.81  ICD-9-CM: 781.2           Referring practitioner: PATRICIO Dorantes  Date of Initial Visit: Type: THERAPY  Noted: 2024  Today's Date: 2025    VISIT#: 8    Subjective : Renate reports that her L hip is feeling better. Still having low back pain that comes and goes. States she has been busy the past few days.     Objective :     See Exercise, Manual, and Modality Logs for complete treatment.     Assessment/Plan : Pt doing well with improved tolerance to ther ex. Continues to have low back pain that limits standing tolerance. Good tolerance to ther ex progression. Needs continued core and B hip strengthening to decrease pain and improve tolerance to all functional tasks.     Goals  Plan Goals: SHORT TERM GOALS: Time for Goal Achievement: 4 weeks  1.  Patient to be compliant and progression of HEP - MET                            2.  Pain level < 4/10 at worst with mentioned activities to improve function - Progressing  3.  No tenderness at L greater trochanter to allow for pt to lay on L side at night. - Progressing     LONG TERM GOALS: Time for Goal Achievement: 12 weeks  1.  Pt. to score < 15 % on Back Index  2.  Improve B hamstring flexibility to 60 deg for improved ability to  bend forward.   3.  Improve 30 sec STS to 10 reps without use of hands for ease getting up from lower surfaces.  4.  (B) LE and lower abdominal strength to 5/5 to allow for pushing, pulling and activities to occur without pain (driving, sitting, household requirements)    Progress per Plan of Care and Progress strengthening /stabilization /functional activity         Timed:         Manual Therapy:    10     mins  53333;     Therapeutic Exercise:    15     mins  99142;     Neuromuscular Damaris:    13    mins  49261;    Therapeutic Activity:          mins  71537;     Gait Training:           mins  87603;     Ultrasound:          mins  29555;    Ionto                                   mins   51027  Self Care                            mins   74059    Un-Timed:  Electrical Stimulation:    15     mins  28847 ( );  Dry Needling          mins 26435/95089  Traction          mins 50111  Canalith Repos                   mins  51978  Low Eval          Mins  70260  Mod Eval          Mins  02003  High Eval                            Mins  21407  Re-Eval                               mins  70301    Timed Treatment:   38   mins   Total Treatment:     53   mins    Caroline De Jesus PT, CLT  Physical Therapist  [place] Lic # [id number]

## 2025-02-11 ENCOUNTER — TREATMENT (OUTPATIENT)
Dept: PHYSICAL THERAPY | Facility: CLINIC | Age: 80
End: 2025-02-11
Payer: MEDICARE

## 2025-02-11 DIAGNOSIS — R53.1 WEAKNESS GENERALIZED: ICD-10-CM

## 2025-02-11 DIAGNOSIS — G89.29 CHRONIC BILATERAL LOW BACK PAIN WITH LEFT-SIDED SCIATICA: Primary | ICD-10-CM

## 2025-02-11 DIAGNOSIS — M25.552 PAIN OF LEFT HIP: ICD-10-CM

## 2025-02-11 DIAGNOSIS — R26.81 UNSTEADINESS ON FEET: ICD-10-CM

## 2025-02-11 DIAGNOSIS — M54.42 CHRONIC BILATERAL LOW BACK PAIN WITH LEFT-SIDED SCIATICA: Primary | ICD-10-CM

## 2025-02-11 NOTE — PROGRESS NOTES
Physical Therapy Daily Treatment Note      Patient: Renate Williamson   : 1945  Diagnosis/ICD-10 Code:  Chronic bilateral low back pain with left-sided sciatica [M54.42, G89.29]   Problems Addressed this Visit    None  Visit Diagnoses       Chronic bilateral low back pain with left-sided sciatica    -  Primary    Pain of left hip        Weakness generalized        Unsteadiness on feet              Diagnoses         Codes Comments    Chronic bilateral low back pain with left-sided sciatica    -  Primary ICD-10-CM: M54.42, G89.29  ICD-9-CM: 724.2, 724.3, 338.29     Pain of left hip     ICD-10-CM: M25.552  ICD-9-CM: 719.45     Weakness generalized     ICD-10-CM: R53.1  ICD-9-CM: 780.79     Unsteadiness on feet     ICD-10-CM: R26.81  ICD-9-CM: 781.2           Referring practitioner: PATRICIO Dorantes  Date of Initial Visit: Type: THERAPY  Noted: 2024  Today's Date: 2025    VISIT#: 9    Subjective : Renate reports she was standing to cook the other day and had to stop and sit down due to back pain. L leg is hurting today too down side of thigh.     Objective : tenderness L greater trochanter and ITB.    See Exercise, Manual, and Modality Logs for complete treatment.     Assessment/Plan : Increased L LE pain at start of PT. Continues to have low back pain that limits standing tolerance. Needs continued core and B hip strengthening to decrease pain and improve tolerance to all functional tasks.     Goals  Plan Goals: SHORT TERM GOALS: Time for Goal Achievement: 4 weeks  1.  Patient to be compliant and progression of HEP - MET                            2.  Pain level < 4/10 at worst with mentioned activities to improve function - Progressing  3.  No tenderness at L greater trochanter to allow for pt to lay on L side at night. - Progressing     LONG TERM GOALS: Time for Goal Achievement: 12 weeks  1.  Pt. to score < 15 % on Back Index  2.  Improve B hamstring flexibility to 60 deg for improved ability to bend  forward.   3.  Improve 30 sec STS to 10 reps without use of hands for ease getting up from lower surfaces.  4.  (B) LE and lower abdominal strength to 5/5 to allow for pushing, pulling and activities to occur without pain (driving, sitting, household requirements)    Progress per Plan of Care and Progress strengthening /stabilization /functional activity      Timed:         Manual Therapy:    10     mins  42272;     Therapeutic Exercise:    15     mins  71887;     Neuromuscular Damaris:        mins  51010;    Therapeutic Activity:          mins  77866;     Gait Training:           mins  72584;     Ultrasound:          mins  92754;    Ionto                                   mins   73039  Self Care                            mins   03487    Un-Timed:  Electrical Stimulation:    15     mins  65092 ( );  Dry Needling          mins 01914/69932  Traction          mins 67597  Canalith Repos                   mins  69664  Low Eval          Mins  03055  Mod Eval          Mins  90870  High Eval                            Mins  96575  Re-Eval                               mins  16288    Timed Treatment:   25   mins   Total Treatment:     40   mins    Caroline De Jesus PT, CLT  Physical Therapist  [place] Lic # [id number]

## 2025-02-18 ENCOUNTER — TELEPHONE (OUTPATIENT)
Dept: PHYSICAL THERAPY | Facility: CLINIC | Age: 80
End: 2025-02-18

## 2025-02-21 ENCOUNTER — TREATMENT (OUTPATIENT)
Dept: PHYSICAL THERAPY | Facility: CLINIC | Age: 80
End: 2025-02-21
Payer: MEDICARE

## 2025-02-21 DIAGNOSIS — G89.29 CHRONIC BILATERAL LOW BACK PAIN WITH LEFT-SIDED SCIATICA: Primary | ICD-10-CM

## 2025-02-21 DIAGNOSIS — M25.552 PAIN OF LEFT HIP: ICD-10-CM

## 2025-02-21 DIAGNOSIS — R53.1 WEAKNESS GENERALIZED: ICD-10-CM

## 2025-02-21 DIAGNOSIS — M54.42 CHRONIC BILATERAL LOW BACK PAIN WITH LEFT-SIDED SCIATICA: Primary | ICD-10-CM

## 2025-02-21 DIAGNOSIS — R26.81 UNSTEADINESS ON FEET: ICD-10-CM

## 2025-02-21 NOTE — PROGRESS NOTES
Physical Therapy Progress Note/Reassessment  [address] [place] [address] , [address], [place] [address]    Patient: Renate Williamson   : 1945  Diagnosis/ICD-10 Code:  Chronic bilateral low back pain with left-sided sciatica [M54.42, G89.29]  Referring practitioner: PATRICIO Dorantes  Date of Initial Evaluation:  Type: THERAPY  Noted: 2024  Treatment date: 2025  Patient seen for 10 sessions      Subjective:   Visit Diagnoses:    ICD-10-CM ICD-9-CM   1. Chronic bilateral low back pain with left-sided sciatica  M54.42 724.2    G89.29 724.3     338.29   2. Pain of left hip  M25.552 719.45   3. Weakness generalized  R53.1 780.79   4. Unsteadiness on feet  R26.81 781.2         Renate Williamson reports still having tenderness down side of L thigh.   Subjective Questionnaire: not completed  Clinical Progress: improved  Home Program Compliance: Yes  Treatment has included: therapeutic exercise, neuromuscular re-education, manual therapy, therapeutic activity, electrical stimulation, and moist heat      Objective :  B hip flex = 4-/5  L hip abd = 4-/5  R hip abd = 4/5    30 sec STS = 10 reps with use of B UE    See Exercise, Manual, and Modality Logs for complete treatment.     Assessment/Plan :  Continued increased L LE pain at start of PT. Continues to have low back pain that limits standing tolerance. Needs continued core and B hip strengthening to decrease pain and improve tolerance to all functional tasks.     Goals  Plan Goals: SHORT TERM GOALS: Time for Goal Achievement: 4 weeks  1.  Patient to be compliant and progression of HEP - MET                            2.  Pain level < 4/10 at worst with mentioned activities to improve function - Progressing  3.  No tenderness at L greater trochanter to allow for pt to lay on L side at night. - Progressing     LONG TERM GOALS: Time for Goal Achievement: 12 weeks  1.  Pt. to score < 15 % on Back Index  2.  Improve B hamstring flexibility to 60 deg for improved ability to bend  forward.   3.  Improve 30 sec STS to 10 reps without use of hands for ease getting up from lower surfaces.  4.  (B) LE and lower abdominal strength to 5/5 to allow for pushing, pulling and activities to occur without pain (driving, sitting, household requirements)       Recommendations: Continue as planned  Timeframe: 1 month  Prognosis to achieve goals: good      Timed:         Manual Therapy:    15     mins  59133;     Therapeutic Exercise:    15     mins  50294;     Neuromuscular Damaris:        mins  74039;    Therapeutic Activity:          mins  07441;     Gait Training:           mins  41252;     Ultrasound:          mins  04957;    Ionto                                   mins   81776  Self Care                            mins   86128      Un-Timed:  Electrical Stimulation:    15     mins  54006 ( );  Dry Needling          mins self-pay  Traction          mins 87737  Canalith Repos         mins 96918      Timed Treatment:   30   mins   Total Treatment:     45   mins    PT Signature: Caroline De Jesus PT, CLT  PT License: [place] Lic # [id number]

## 2025-02-25 ENCOUNTER — TREATMENT (OUTPATIENT)
Dept: PHYSICAL THERAPY | Facility: CLINIC | Age: 80
End: 2025-02-25
Payer: MEDICARE

## 2025-02-25 DIAGNOSIS — G89.29 CHRONIC BILATERAL LOW BACK PAIN WITH LEFT-SIDED SCIATICA: Primary | ICD-10-CM

## 2025-02-25 DIAGNOSIS — M25.552 PAIN OF LEFT HIP: ICD-10-CM

## 2025-02-25 DIAGNOSIS — M54.42 CHRONIC BILATERAL LOW BACK PAIN WITH LEFT-SIDED SCIATICA: Primary | ICD-10-CM

## 2025-02-25 DIAGNOSIS — R53.1 WEAKNESS GENERALIZED: ICD-10-CM

## 2025-02-25 DIAGNOSIS — R26.81 UNSTEADINESS ON FEET: ICD-10-CM

## 2025-02-25 NOTE — PROGRESS NOTES
Physical Therapy Daily Treatment Note      Patient: Renate Williamson   : 1945  Diagnosis/ICD-10 Code:  Chronic bilateral low back pain with left-sided sciatica [M54.42, G89.29]   Problems Addressed this Visit    None  Visit Diagnoses       Chronic bilateral low back pain with left-sided sciatica    -  Primary    Pain of left hip        Weakness generalized        Unsteadiness on feet              Diagnoses         Codes Comments    Chronic bilateral low back pain with left-sided sciatica    -  Primary ICD-10-CM: M54.42, G89.29  ICD-9-CM: 724.2, 724.3, 338.29     Pain of left hip     ICD-10-CM: M25.552  ICD-9-CM: 719.45     Weakness generalized     ICD-10-CM: R53.1  ICD-9-CM: 780.79     Unsteadiness on feet     ICD-10-CM: R26.81  ICD-9-CM: 781.2           Referring practitioner: PATRICIO Dorantes  Date of Initial Visit: Type: THERAPY  Noted: 2024  Today's Date: 2025    VISIT#: 11    Subjective : Renate reports she is feeling better. Started taking glucosamine again and L knee feels much better. Not having pain down side of L leg today.     Objective :     See Exercise, Manual, and Modality Logs for complete treatment.     Assessment/Plan: Decreased pain at start of session. Continued B HS tightness with R LE being tighter today. Able to complete all ther ex with B LE fatigue reported after leg press. Needs continued core and B hip strengthening to decrease pain and improve tolerance to all functional tasks.      Goals  Plan Goals: SHORT TERM GOALS: Time for Goal Achievement: 4 weeks  1.  Patient to be compliant and progression of HEP - MET                            2.  Pain level < 4/10 at worst with mentioned activities to improve function - Progressing  3.  No tenderness at L greater trochanter to allow for pt to lay on L side at night. - Progressing     LONG TERM GOALS: Time for Goal Achievement: 12 weeks  1.  Pt. to score < 15 % on Back Index  2.  Improve B hamstring flexibility to 60 deg for  improved ability to bend forward.   3.  Improve 30 sec STS to 10 reps without use of hands for ease getting up from lower surfaces.  4.  (B) LE and lower abdominal strength to 5/5 to allow for pushing, pulling and activities to occur without pain (driving, sitting, household requirements)    Progress per Plan of Care and Progress strengthening /stabilization /functional activity         Timed:         Manual Therapy:    15     mins  71827;     Therapeutic Exercise:    15     mins  76717;     Neuromuscular Damaris:    8    mins  06715;    Therapeutic Activity:          mins  69918;     Gait Training:           mins  49280;     Ultrasound:          mins  88206;    Ionto                                   mins   96262  Self Care                            mins   81591    Un-Timed:  Electrical Stimulation:    15     mins  14902 ( );  Dry Needling          mins 99489/66634  Traction          mins 93648  Canalith Repos                   mins  51071  Low Eval          Mins  76905  Mod Eval          Mins  94429  High Eval                            Mins  28089  Re-Eval                               mins  22344    Timed Treatment:   38   mins   Total Treatment:     53   mins    Caroline De Jesus PT, CLT  Physical Therapist  [place] Lic # [id number]

## 2025-03-04 ENCOUNTER — TREATMENT (OUTPATIENT)
Dept: PHYSICAL THERAPY | Facility: CLINIC | Age: 80
End: 2025-03-04
Payer: MEDICARE

## 2025-03-04 DIAGNOSIS — R26.81 UNSTEADINESS ON FEET: ICD-10-CM

## 2025-03-04 DIAGNOSIS — G89.29 CHRONIC BILATERAL LOW BACK PAIN WITH LEFT-SIDED SCIATICA: Primary | ICD-10-CM

## 2025-03-04 DIAGNOSIS — M25.552 PAIN OF LEFT HIP: ICD-10-CM

## 2025-03-04 DIAGNOSIS — R53.1 WEAKNESS GENERALIZED: ICD-10-CM

## 2025-03-04 DIAGNOSIS — M54.42 CHRONIC BILATERAL LOW BACK PAIN WITH LEFT-SIDED SCIATICA: Primary | ICD-10-CM

## 2025-03-04 NOTE — PROGRESS NOTES
Physical Therapy Daily Treatment Note      Patient: Renate Williamson   : 1945  Diagnosis/ICD-10 Code:  Chronic bilateral low back pain with left-sided sciatica [M54.42, G89.29]   Problems Addressed this Visit    None  Visit Diagnoses       Chronic bilateral low back pain with left-sided sciatica    -  Primary    Pain of left hip        Weakness generalized        Unsteadiness on feet              Diagnoses         Codes Comments    Chronic bilateral low back pain with left-sided sciatica    -  Primary ICD-10-CM: M54.42, G89.29  ICD-9-CM: 724.2, 724.3, 338.29     Pain of left hip     ICD-10-CM: M25.552  ICD-9-CM: 719.45     Weakness generalized     ICD-10-CM: R53.1  ICD-9-CM: 780.79     Unsteadiness on feet     ICD-10-CM: R26.81  ICD-9-CM: 781.2           Referring practitioner: PATRICIO Dorantes  Date of Initial Visit: Type: THERAPY  Noted: 2024  Today's Date: 3/4/2025    VISIT#: 12    Subjective : Renate reports no changes since last visit. Has had stressful few days due to family issues.     Objective :     See Exercise, Manual, and Modality Logs for complete treatment.     Assessment/Plan : Continued low back pain. Continued B HS tightness with R LE being tighter today. Able to complete all ther ex with B LE fatigue reported after leg press. Needs continued core and B hip strengthening to decrease pain and improve tolerance to all functional tasks.      Goals  Plan Goals: SHORT TERM GOALS: Time for Goal Achievement: 4 weeks  1.  Patient to be compliant and progression of HEP - MET                            2.  Pain level < 4/10 at worst with mentioned activities to improve function - Progressing  3.  No tenderness at L greater trochanter to allow for pt to lay on L side at night. - Progressing     LONG TERM GOALS: Time for Goal Achievement: 12 weeks  1.  Pt. to score < 15 % on Back Index  2.  Improve B hamstring flexibility to 60 deg for improved ability to bend forward.   3.  Improve 30 sec STS to 10  reps without use of hands for ease getting up from lower surfaces.  4.  (B) LE and lower abdominal strength to 5/5 to allow for pushing, pulling and activities to occur without pain (driving, sitting, household requirements)    Progress per Plan of Care and Progress strengthening /stabilization /functional activity         Timed:         Manual Therapy:    15     mins  57227;     Therapeutic Exercise:    15     mins  27835;     Neuromuscular Damaris:    8    mins  83045;    Therapeutic Activity:          mins  30535;     Gait Training:           mins  66239;     Ultrasound:          mins  06956;    Ionto                                   mins   43287  Self Care                            mins   87653    Un-Timed:  Electrical Stimulation:    15     mins  21158 ( );  Dry Needling          mins 73467/00743  Traction          mins 34641  Canalith Repos                   mins  86755  Low Eval          Mins  60090  Mod Eval          Mins  68798  High Eval                            Mins  75483  Re-Eval                               mins  13345    Timed Treatment:   38   mins   Total Treatment:     53   mins    Caroline De Jesus PT, CLT  Physical Therapist  [place] Lic # [id number]

## 2025-03-11 ENCOUNTER — TREATMENT (OUTPATIENT)
Dept: PHYSICAL THERAPY | Facility: CLINIC | Age: 80
End: 2025-03-11
Payer: MEDICARE

## 2025-03-11 DIAGNOSIS — M25.552 PAIN OF LEFT HIP: ICD-10-CM

## 2025-03-11 DIAGNOSIS — R26.81 UNSTEADINESS ON FEET: ICD-10-CM

## 2025-03-11 DIAGNOSIS — M54.42 CHRONIC BILATERAL LOW BACK PAIN WITH LEFT-SIDED SCIATICA: Primary | ICD-10-CM

## 2025-03-11 DIAGNOSIS — G89.29 CHRONIC BILATERAL LOW BACK PAIN WITH LEFT-SIDED SCIATICA: Primary | ICD-10-CM

## 2025-03-11 DIAGNOSIS — R53.1 WEAKNESS GENERALIZED: ICD-10-CM

## 2025-03-11 NOTE — PROGRESS NOTES
Re-Assessment / Re-Certification  [address] [place] [address] , [address], [place] [address]        Patient: Renate Williamson   : 1945  Diagnosis/ICD-10 Code:  Chronic bilateral low back pain with left-sided sciatica [M54.42, G89.29]  Referring practitioner: PATRICIO Dorantes  Date of Initial Visit: Type: THERAPY  Noted: 2024  Today's Date: 3/11/2025  Patient seen for 13 sessions      Subjective:   Renate Williamson reports: her back has been feeling better. States she was very sick last Fri with vomiting.   Subjective Questionnaire: not completed  Clinical Progress: improved  Home Program Compliance: Yes  Treatment has included: therapeutic exercise, neuromuscular re-education, manual therapy, therapeutic activity, electrical stimulation, and moist heat    Objective          Strength/Myotome Testing     Left Hip   Planes of Motion   Flexion: 4  Abduction: 4    Right Hip   Planes of Motion   Flexion: 4+  Abduction: 4-       Hamstring flex from 90/90: R = 53 deg, L = 45 deg      Assessment/Plan : Renate has made gains with decreased L lateral thigh pain. She continues to have low back pain with prolonged standing that is chronic but standing tolerance starting to improve. She has made gains in B hip flex and L abduction. She will benefit from continued PT to progress core and B LE strength and B hamstring flexibility to further decrease pain and improve overall functional mobility with decreased fall risk.      Progress toward previous goals: Partially Met    Goals  Plan Goals: SHORT TERM GOALS: Time for Goal Achievement: 4 weeks  1.  Patient to be compliant and progression of HEP - MET                            2.  Pain level < 4/10 at worst with mentioned activities to improve function - Progressing  3.  No tenderness at L greater trochanter to allow for pt to lay on L side at night. - Progressing     LONG TERM GOALS: Time for Goal Achievement: 12 weeks  1.  Pt. to score < 15 % on Back Index  2.  Improve B hamstring  flexibility to 60 deg for improved ability to bend forward.   3.  Improve 30 sec STS to 10 reps without use of hands for ease getting up from lower surfaces.  4.  (B) LE and lower abdominal strength to 5/5 to allow for pushing, pulling and activities to occur without pain (driving, sitting, household requirements)    New Goals  Short-term goals (STG): will continue to work toward established goals  Long-term goals (LTG): will continue to work toward established goals      Recommendations: Continue as planned  Timeframe: 3 months  Frequency: 1x/wk  Prognosis to achieve goals: good    PT Signature: Caroline De Jesus PT, CLT  Physical Therapist  [place] Lic # [id number]  Electronically signed by Caroline De Jesus PT, 03/11/25, 2:11 PM EDT    Certification Period: 3/11/2025 thru 6/8/2025  I certify that the therapy services are furnished while this patient is under my care. The services outlined above are required by this patient and will be reviewed every 90 days.    PHYSICIAN: Kayley Livingston APRN _____________________________________________________________  NPI: [id number]                                      DATE:    ___________________________________________      Timed:         Manual Therapy:    15     mins  31893;     Therapeutic Exercise:    10     mins  25418;     Neuromuscular Damaris:        mins  39425;    Therapeutic Activity:          mins  22086;     Gait Training:           mins  97081;     Ultrasound:          mins  38115;    Ionto                                   mins   13389  Self Care                            mins   43211    Un-Timed:  Electrical Stimulation:    15     mins  47656 ( );  Dry Needling          mins 20561/20560  Traction          mins 87980  Can Repos          mins 41101  Re-Eval                         15      mins  69896      Timed Treatment:   25   mins   Total Treatment:     55   mins

## 2025-03-18 ENCOUNTER — TREATMENT (OUTPATIENT)
Dept: PHYSICAL THERAPY | Facility: CLINIC | Age: 80
End: 2025-03-18
Payer: MEDICARE

## 2025-03-18 DIAGNOSIS — R26.81 UNSTEADINESS ON FEET: ICD-10-CM

## 2025-03-18 DIAGNOSIS — M54.42 CHRONIC BILATERAL LOW BACK PAIN WITH LEFT-SIDED SCIATICA: Primary | ICD-10-CM

## 2025-03-18 DIAGNOSIS — M25.552 PAIN OF LEFT HIP: ICD-10-CM

## 2025-03-18 DIAGNOSIS — G89.29 CHRONIC BILATERAL LOW BACK PAIN WITH LEFT-SIDED SCIATICA: Primary | ICD-10-CM

## 2025-03-18 DIAGNOSIS — R53.1 WEAKNESS GENERALIZED: ICD-10-CM

## 2025-03-18 PROCEDURE — G0283 ELEC STIM OTHER THAN WOUND: HCPCS | Performed by: PHYSICAL THERAPIST

## 2025-03-18 PROCEDURE — 97140 MANUAL THERAPY 1/> REGIONS: CPT | Performed by: PHYSICAL THERAPIST

## 2025-03-18 PROCEDURE — 97110 THERAPEUTIC EXERCISES: CPT | Performed by: PHYSICAL THERAPIST

## 2025-03-18 NOTE — PROGRESS NOTES
Physical Therapy Daily Treatment Note      Patient: Renate Williamson   : 1945  Diagnosis/ICD-10 Code:  Chronic bilateral low back pain with left-sided sciatica [M54.42, G89.29]   Problems Addressed this Visit    None  Visit Diagnoses         Chronic bilateral low back pain with left-sided sciatica    -  Primary      Pain of left hip          Weakness generalized          Unsteadiness on feet              Diagnoses         Codes Comments      Chronic bilateral low back pain with left-sided sciatica    -  Primary ICD-10-CM: M54.42, G89.29  ICD-9-CM: 724.2, 724.3, 338.29       Pain of left hip     ICD-10-CM: M25.552  ICD-9-CM: 719.45       Weakness generalized     ICD-10-CM: R53.1  ICD-9-CM: 780.79       Unsteadiness on feet     ICD-10-CM: R26.81  ICD-9-CM: 781.2           Referring practitioner: PATRICIO Dorantes  Date of Initial Visit: Type: THERAPY  Noted: 2024  Today's Date: 3/18/2025    VISIT#: 14    Subjective : Renate reports that her back is feeling better. States L hip is feeling better too. Still has some pain but not nearly as intense as it was before.     Objective :     See Exercise, Manual, and Modality Logs for complete treatment.     Assessment/Plan : Decreased pain at start of session. Good ability during ther ex. Making gradual strength gains.   Goals  Plan Goals: SHORT TERM GOALS: Time for Goal Achievement: 4 weeks  1.  Patient to be compliant and progression of HEP - MET                            2.  Pain level < 4/10 at worst with mentioned activities to improve function - Progressing  3.  No tenderness at L greater trochanter to allow for pt to lay on L side at night. - Progressing     LONG TERM GOALS: Time for Goal Achievement: 12 weeks  1.  Pt. to score < 15 % on Back Index  2.  Improve B hamstring flexibility to 60 deg for improved ability to bend forward.   3.  Improve 30 sec STS to 10 reps without use of hands for ease getting up from lower surfaces.  4.  (B) LE and lower  abdominal strength to 5/5 to allow for pushing, pulling and activities to occur without pain (driving, sitting, household requirements)    Progress per Plan of Care and Progress strengthening /stabilization /functional activity         Timed:         Manual Therapy:    15     mins  30273;     Therapeutic Exercise:    10     mins  13057;     Neuromuscular Damaris:        mins  77095;    Therapeutic Activity:          mins  61077;     Gait Training:           mins  25625;     Ultrasound:          mins  99532;    Ionto                                   mins   89410  Self Care                            mins   31583    Un-Timed:  Electrical Stimulation:    15     mins  69120 ( );  Dry Needling          mins 56209/74431  Traction          mins 65233  Canalith Repos                   mins  38976  Low Eval          Mins  76011  Mod Eval          Mins  59924  High Eval                            Mins  32799  Re-Eval                               mins  89496    Timed Treatment:   25   mins   Total Treatment:     40   mins    Caroline De Jesus PT, CLT  Physical Therapist  [place] Lic # [id number]

## 2025-03-25 ENCOUNTER — TREATMENT (OUTPATIENT)
Dept: PHYSICAL THERAPY | Facility: CLINIC | Age: 80
End: 2025-03-25
Payer: MEDICARE

## 2025-03-25 DIAGNOSIS — M25.552 PAIN OF LEFT HIP: ICD-10-CM

## 2025-03-25 DIAGNOSIS — M54.42 CHRONIC BILATERAL LOW BACK PAIN WITH LEFT-SIDED SCIATICA: Primary | ICD-10-CM

## 2025-03-25 DIAGNOSIS — R53.1 WEAKNESS GENERALIZED: ICD-10-CM

## 2025-03-25 DIAGNOSIS — G89.29 CHRONIC BILATERAL LOW BACK PAIN WITH LEFT-SIDED SCIATICA: Primary | ICD-10-CM

## 2025-03-25 DIAGNOSIS — R26.81 UNSTEADINESS ON FEET: ICD-10-CM

## 2025-03-25 PROCEDURE — 97110 THERAPEUTIC EXERCISES: CPT | Performed by: PHYSICAL THERAPIST

## 2025-03-25 PROCEDURE — 97140 MANUAL THERAPY 1/> REGIONS: CPT | Performed by: PHYSICAL THERAPIST

## 2025-03-25 PROCEDURE — G0283 ELEC STIM OTHER THAN WOUND: HCPCS | Performed by: PHYSICAL THERAPIST

## 2025-03-25 PROCEDURE — 97112 NEUROMUSCULAR REEDUCATION: CPT | Performed by: PHYSICAL THERAPIST

## 2025-04-01 ENCOUNTER — TREATMENT (OUTPATIENT)
Dept: PHYSICAL THERAPY | Facility: CLINIC | Age: 80
End: 2025-04-01
Payer: MEDICARE

## 2025-04-01 DIAGNOSIS — M54.42 CHRONIC BILATERAL LOW BACK PAIN WITH LEFT-SIDED SCIATICA: Primary | ICD-10-CM

## 2025-04-01 DIAGNOSIS — M25.552 PAIN OF LEFT HIP: ICD-10-CM

## 2025-04-01 DIAGNOSIS — R53.1 WEAKNESS GENERALIZED: ICD-10-CM

## 2025-04-01 DIAGNOSIS — R26.81 UNSTEADINESS ON FEET: ICD-10-CM

## 2025-04-01 DIAGNOSIS — G89.29 CHRONIC BILATERAL LOW BACK PAIN WITH LEFT-SIDED SCIATICA: Primary | ICD-10-CM

## 2025-04-01 PROCEDURE — 97140 MANUAL THERAPY 1/> REGIONS: CPT | Performed by: PHYSICAL THERAPIST

## 2025-04-01 PROCEDURE — G0283 ELEC STIM OTHER THAN WOUND: HCPCS | Performed by: PHYSICAL THERAPIST

## 2025-04-01 PROCEDURE — 97110 THERAPEUTIC EXERCISES: CPT | Performed by: PHYSICAL THERAPIST

## 2025-04-01 PROCEDURE — 97112 NEUROMUSCULAR REEDUCATION: CPT | Performed by: PHYSICAL THERAPIST

## 2025-04-01 NOTE — PROGRESS NOTES
Physical Therapy Daily Treatment Note      Patient: Renate Williamson   : 1945  Diagnosis/ICD-10 Code:  Chronic bilateral low back pain with left-sided sciatica [G89.29, M54.42]   Problems Addressed this Visit    None  Visit Diagnoses         Chronic bilateral low back pain with left-sided sciatica    -  Primary      Pain of left hip          Weakness generalized          Unsteadiness on feet              Diagnoses         Codes Comments      Chronic bilateral low back pain with left-sided sciatica    -  Primary ICD-10-CM: G89.29, M54.42  ICD-9-CM: 338.29, 724.2, 724.3       Pain of left hip     ICD-10-CM: M25.552  ICD-9-CM: 719.45       Weakness generalized     ICD-10-CM: R53.1  ICD-9-CM: 780.79       Unsteadiness on feet     ICD-10-CM: R26.81  ICD-9-CM: 781.2           Referring practitioner: PATRICIO Dorantes  Date of Initial Visit: Type: THERAPY  Noted: 2024  Today's Date: 2025    VISIT#: 16    Subjective : Renate reports her back is feeling ok today. Unloading  aggravates her back with repetitive bending over, back up, and reaching over head.    Objective : added modified dead bug (arms only) from counter plank position.    See Exercise, Manual, and Modality Logs for complete treatment.     Assessment/Plan : Renate continues to have low back pain with unloading  being most bothersome. Good tolerance to ther ex progression. Unable to balance to perform arm and leg lift during dead bug. Will continue to progress as tolerated.     Progress per Plan of Care and Progress strengthening /stabilization /functional activity      Timed:         Manual Therapy:    10     mins  85513;     Therapeutic Exercise:    15     mins  18655;     Neuromuscular Damaris:    15    mins  74706;    Therapeutic Activity:          mins  12279;     Gait Training:           mins  80163;     Ultrasound:          mins  77446;    Ionto                                   mins   67843  Self Care                             mins   04297    Un-Timed:  Electrical Stimulation:    15     mins  89977 ( );  Dry Needling          mins 17028/03987  Traction          mins 50284  Canalith Repos                   mins  99035  Low Eval          Mins  49538  Mod Eval          Mins  40362  High Eval                            Mins  21628  Re-Eval                               mins  00212    Timed Treatment:   40   mins   Total Treatment:     55   mins    Caroline De Jesus PT, CLT  Physical Therapist  [place] Lic # [id number]

## 2025-04-07 ENCOUNTER — TREATMENT (OUTPATIENT)
Dept: PHYSICAL THERAPY | Facility: CLINIC | Age: 80
End: 2025-04-07
Payer: MEDICARE

## 2025-04-07 DIAGNOSIS — G89.29 CHRONIC BILATERAL LOW BACK PAIN WITH LEFT-SIDED SCIATICA: Primary | ICD-10-CM

## 2025-04-07 DIAGNOSIS — M54.42 CHRONIC BILATERAL LOW BACK PAIN WITH LEFT-SIDED SCIATICA: Primary | ICD-10-CM

## 2025-04-07 DIAGNOSIS — M25.552 PAIN OF LEFT HIP: ICD-10-CM

## 2025-04-07 DIAGNOSIS — R26.81 UNSTEADINESS ON FEET: ICD-10-CM

## 2025-04-07 DIAGNOSIS — R53.1 WEAKNESS GENERALIZED: ICD-10-CM

## 2025-04-07 PROCEDURE — 97110 THERAPEUTIC EXERCISES: CPT | Performed by: PHYSICAL THERAPIST

## 2025-04-07 PROCEDURE — 97140 MANUAL THERAPY 1/> REGIONS: CPT | Performed by: PHYSICAL THERAPIST

## 2025-04-07 PROCEDURE — G0283 ELEC STIM OTHER THAN WOUND: HCPCS | Performed by: PHYSICAL THERAPIST

## 2025-04-07 PROCEDURE — 97112 NEUROMUSCULAR REEDUCATION: CPT | Performed by: PHYSICAL THERAPIST

## 2025-04-07 NOTE — PROGRESS NOTES
Physical Therapy Daily Treatment Note      Patient: Renate Williamson   : 1945  Diagnosis/ICD-10 Code:  Chronic bilateral low back pain with left-sided sciatica [G89.29, M54.42]   Problems Addressed this Visit    None  Visit Diagnoses         Chronic bilateral low back pain with left-sided sciatica    -  Primary      Pain of left hip          Weakness generalized          Unsteadiness on feet              Diagnoses         Codes Comments      Chronic bilateral low back pain with left-sided sciatica    -  Primary ICD-10-CM: G89.29, M54.42  ICD-9-CM: 338.29, 724.2, 724.3       Pain of left hip     ICD-10-CM: M25.552  ICD-9-CM: 719.45       Weakness generalized     ICD-10-CM: R53.1  ICD-9-CM: 780.79       Unsteadiness on feet     ICD-10-CM: R26.81  ICD-9-CM: 781.2           Referring practitioner: PATRICIO Dorantes  Date of Initial Visit: Type: THERAPY  Noted: 2024  Today's Date: 2025    VISIT#: 17    Subjective : Renate reports her L hip has been bothering her more than it has recently. Having L knee pain with going up and down stairs. Did not do any extra activity.    Objective : added quad stretch B. Tightness present B quad with L being tighter.    Added dead lift and dumbbell snatch with 2# to simulate unloading .    See Exercise, Manual, and Modality Logs for complete treatment.     Assessment/Plan : Increased L hip pain at start of session. Pt with significant tightness of L quad that is contributing to knee pain with stairs.    Progress per Plan of Care and Progress strengthening /stabilization /functional activity         Timed:         Manual Therapy:    10     mins  69145;     Therapeutic Exercise:    15     mins  07246;     Neuromuscular Damaris:    13    mins  70802;    Therapeutic Activity:          mins  47955;     Gait Training:           mins  78587;     Ultrasound:          mins  09947;    Ionto                                   mins   60071  Self Care                             mins   23996    Un-Timed:  Electrical Stimulation:    15     mins  30065 ( );  Dry Needling          mins 67069/40123  Traction          mins 23567  Canalith Repos                   mins  96007  Low Eval          Mins  55767  Mod Eval          Mins  03619  High Eval                            Mins  18065  Re-Eval                               mins  63289    Timed Treatment:   38   mins   Total Treatment:     53   mins    Caroline De Jesus PT, CLT  Physical Therapist  [place] Lic # [id number]

## 2025-04-15 ENCOUNTER — TREATMENT (OUTPATIENT)
Dept: PHYSICAL THERAPY | Facility: CLINIC | Age: 80
End: 2025-04-15
Payer: MEDICARE

## 2025-04-15 DIAGNOSIS — R53.1 WEAKNESS GENERALIZED: ICD-10-CM

## 2025-04-15 DIAGNOSIS — R26.81 UNSTEADINESS ON FEET: ICD-10-CM

## 2025-04-15 DIAGNOSIS — G89.29 CHRONIC BILATERAL LOW BACK PAIN WITH LEFT-SIDED SCIATICA: Primary | ICD-10-CM

## 2025-04-15 DIAGNOSIS — M25.552 PAIN OF LEFT HIP: ICD-10-CM

## 2025-04-15 DIAGNOSIS — M54.42 CHRONIC BILATERAL LOW BACK PAIN WITH LEFT-SIDED SCIATICA: Primary | ICD-10-CM

## 2025-04-15 PROCEDURE — G0283 ELEC STIM OTHER THAN WOUND: HCPCS | Performed by: PHYSICAL THERAPIST

## 2025-04-15 PROCEDURE — 97112 NEUROMUSCULAR REEDUCATION: CPT | Performed by: PHYSICAL THERAPIST

## 2025-04-15 PROCEDURE — 97110 THERAPEUTIC EXERCISES: CPT | Performed by: PHYSICAL THERAPIST

## 2025-04-15 PROCEDURE — 97140 MANUAL THERAPY 1/> REGIONS: CPT | Performed by: PHYSICAL THERAPIST

## 2025-04-15 NOTE — PROGRESS NOTES
Physical Therapy Daily Treatment Note      Patient: Renate Williamson   : 1945  Diagnosis/ICD-10 Code:  Chronic bilateral low back pain with left-sided sciatica [G89.29, M54.42]   Problems Addressed this Visit    None  Visit Diagnoses         Chronic bilateral low back pain with left-sided sciatica    -  Primary      Pain of left hip          Weakness generalized          Unsteadiness on feet              Diagnoses         Codes Comments      Chronic bilateral low back pain with left-sided sciatica    -  Primary ICD-10-CM: G89.29, M54.42  ICD-9-CM: 338.29, 724.2, 724.3       Pain of left hip     ICD-10-CM: M25.552  ICD-9-CM: 719.45       Weakness generalized     ICD-10-CM: R53.1  ICD-9-CM: 780.79       Unsteadiness on feet     ICD-10-CM: R26.81  ICD-9-CM: 781.2           Referring practitioner: PATRICIO Dorantes  Date of Initial Visit: Type: THERAPY  Noted: 2024  Today's Date: 4/15/2025    VISIT#: 18    Subjective : Renate reports she is sore in her back and legs today. Went to a rally on Sat and walked miles.     Objective :     See Exercise, Manual, and Modality Logs for complete treatment.     Assessment/Plan : Renate had generalized muscle soreness at start of session related to attending rally over the weekend and walking a lot. Able to complete gentle ther ex with no increase in symptoms. Responds well to e-stim with MHP.    Progress per Plan of Care and Progress strengthening /stabilization /functional activity         Timed:         Manual Therapy:    10     mins  90616;     Therapeutic Exercise:    15     mins  40938;     Neuromuscular Damaris:    13    mins  26465;    Therapeutic Activity:          mins  59458;     Gait Training:           mins  13630;     Ultrasound:          mins  11456;    Ionto                                   mins   15475  Self Care                            mins   38546    Un-Timed:  Electrical Stimulation:    15     mins  88234 ( );  Dry Needling          mins  20561/20560  Traction          mins 25495  Canalith Repos                   mins  06947  Low Eval          Mins  31182  Mod Eval          Mins  38644  High Eval                            Mins  28647  Re-Eval                               mins  11073    Timed Treatment:   38   mins   Total Treatment:     53   mins    Caroline De Jesus PT, CLT  Physical Therapist  [place] Lic # [id number]

## 2025-04-22 ENCOUNTER — TREATMENT (OUTPATIENT)
Dept: PHYSICAL THERAPY | Facility: CLINIC | Age: 80
End: 2025-04-22
Payer: MEDICARE

## 2025-04-22 DIAGNOSIS — R53.1 WEAKNESS GENERALIZED: ICD-10-CM

## 2025-04-22 DIAGNOSIS — M25.552 PAIN OF LEFT HIP: ICD-10-CM

## 2025-04-22 DIAGNOSIS — R26.81 UNSTEADINESS ON FEET: ICD-10-CM

## 2025-04-22 DIAGNOSIS — M54.42 CHRONIC BILATERAL LOW BACK PAIN WITH LEFT-SIDED SCIATICA: Primary | ICD-10-CM

## 2025-04-22 DIAGNOSIS — G89.29 CHRONIC BILATERAL LOW BACK PAIN WITH LEFT-SIDED SCIATICA: Primary | ICD-10-CM

## 2025-04-22 PROCEDURE — 97110 THERAPEUTIC EXERCISES: CPT | Performed by: PHYSICAL THERAPIST

## 2025-04-22 PROCEDURE — 97140 MANUAL THERAPY 1/> REGIONS: CPT | Performed by: PHYSICAL THERAPIST

## 2025-04-22 PROCEDURE — G0283 ELEC STIM OTHER THAN WOUND: HCPCS | Performed by: PHYSICAL THERAPIST

## 2025-04-22 PROCEDURE — 97112 NEUROMUSCULAR REEDUCATION: CPT | Performed by: PHYSICAL THERAPIST

## 2025-04-22 NOTE — PROGRESS NOTES
Physical Therapy Daily Treatment Note      Patient: Renate Williamson   : 1945  Diagnosis/ICD-10 Code:  Chronic bilateral low back pain with left-sided sciatica [G89.29, M54.42]   Problems Addressed this Visit    None  Visit Diagnoses         Chronic bilateral low back pain with left-sided sciatica    -  Primary      Pain of left hip          Weakness generalized          Unsteadiness on feet              Diagnoses         Codes Comments      Chronic bilateral low back pain with left-sided sciatica    -  Primary ICD-10-CM: G89.29, M54.42  ICD-9-CM: 338.29, 724.2, 724.3       Pain of left hip     ICD-10-CM: M25.552  ICD-9-CM: 719.45       Weakness generalized     ICD-10-CM: R53.1  ICD-9-CM: 780.79       Unsteadiness on feet     ICD-10-CM: R26.81  ICD-9-CM: 781.2           Referring practitioner: PATRICIO Dorantes  Date of Initial Visit: Type: THERAPY  Noted: 2024  Today's Date: 2025    VISIT#: 19    Subjective : Renate reports that she is having R knee pain and feels like it is mostly muscular. Indicates pain is above her knee.     Objective : tenderness and muscle knots noted in distal L quad. Performed L quad stretch in ari test position and applied MHP to L distal quad during e-stim and MHP to lumbar region.    See Exercise, Manual, and Modality Logs for complete treatment.     Assessment/Plan : Continued L quad pain/soreness. Good tolerance to ther ex but did fatigue quicker on leg press. Decreased L quad pain reported post-tx.    Goals  Plan Goals: SHORT TERM GOALS: Time for Goal Achievement: 4 weeks  1.  Patient to be compliant and progression of HEP - MET                            2.  Pain level < 4/10 at worst with mentioned activities to improve function - Progressing  3.  No tenderness at L greater trochanter to allow for pt to lay on L side at night. - Progressing     LONG TERM GOALS: Time for Goal Achievement: 12 weeks  1.  Pt. to score < 15 % on Back Index  2.  Improve B hamstring  flexibility to 60 deg for improved ability to bend forward.   3.  Improve 30 sec STS to 10 reps without use of hands for ease getting up from lower surfaces.  4.  (B) LE and lower abdominal strength to 5/5 to allow for pushing, pulling and activities to occur without pain (driving, sitting, household requirements)       Progress per Plan of Care and Progress strengthening /stabilization /functional activity         Timed:         Manual Therapy:    10     mins  53699;     Therapeutic Exercise:    15     mins  12716;     Neuromuscular Damaris:    13    mins  57787;    Therapeutic Activity:          mins  46456;     Gait Training:           mins  23459;     Ultrasound:          mins  72345;    Ionto                                   mins   39723  Self Care                            mins   53798    Un-Timed:  Electrical Stimulation:    15     mins  80346 ( );  Dry Needling          mins 31676/18218  Traction          mins 16277  Canalith Repos                   mins  07570  Low Eval          Mins  30072  Mod Eval          Mins  64424  High Eval                            Mins  00144  Re-Eval                               mins  56718    Timed Treatment:   38   mins   Total Treatment:     53   mins    Caroline De Jesus PT, CLT  Physical Therapist  [place] Lic # [id number]

## 2025-04-29 ENCOUNTER — TREATMENT (OUTPATIENT)
Dept: PHYSICAL THERAPY | Facility: CLINIC | Age: 80
End: 2025-04-29
Payer: MEDICARE

## 2025-04-29 DIAGNOSIS — R53.1 WEAKNESS GENERALIZED: ICD-10-CM

## 2025-04-29 DIAGNOSIS — M25.552 PAIN OF LEFT HIP: ICD-10-CM

## 2025-04-29 DIAGNOSIS — R26.81 UNSTEADINESS ON FEET: ICD-10-CM

## 2025-04-29 DIAGNOSIS — M54.42 CHRONIC BILATERAL LOW BACK PAIN WITH LEFT-SIDED SCIATICA: Primary | ICD-10-CM

## 2025-04-29 DIAGNOSIS — G89.29 CHRONIC BILATERAL LOW BACK PAIN WITH LEFT-SIDED SCIATICA: Primary | ICD-10-CM

## 2025-04-29 PROCEDURE — G0283 ELEC STIM OTHER THAN WOUND: HCPCS | Performed by: PHYSICAL THERAPIST

## 2025-04-29 PROCEDURE — 97112 NEUROMUSCULAR REEDUCATION: CPT | Performed by: PHYSICAL THERAPIST

## 2025-04-29 PROCEDURE — 97110 THERAPEUTIC EXERCISES: CPT | Performed by: PHYSICAL THERAPIST

## 2025-04-29 PROCEDURE — 97140 MANUAL THERAPY 1/> REGIONS: CPT | Performed by: PHYSICAL THERAPIST

## 2025-04-29 NOTE — PROGRESS NOTES
Physical Therapy Progress Note/Reassessment  [address] [place] [address] , [address], [place] [address]    Patient: Renate Williamson   : 1945  Diagnosis/ICD-10 Code:  Chronic bilateral low back pain with left-sided sciatica [G89.29, M54.42]  Referring practitioner: PATRICIO Dorantes  Date of Initial Evaluation:  Type: THERAPY  Noted: 2024  Treatment date: 2025  Patient seen for 20 sessions      Subjective:   Visit Diagnoses:    ICD-10-CM ICD-9-CM   1. Chronic bilateral low back pain with left-sided sciatica  G89.29 338.29    M54.42 724.2     724.3   2. Pain of left hip  M25.552 719.45   3. Weakness generalized  R53.1 780.79   4. Unsteadiness on feet  R26.81 781.2         Renate Williamson reports L hip has been feeling better. Knee pain is gone.   Subjective Questionnaire: not completed  Clinical Progress: improved  Home Program Compliance: Yes  Treatment has included: therapeutic exercise, neuromuscular re-education, manual therapy, therapeutic activity, gait training, electrical stimulation, and moist heat      Objective     Strength/Myotome Testing      Left Hip   Planes of Motion   Flexion: 4  Abduction: 4     Right Hip   Planes of Motion   Flexion: 4+  Abduction: 4- to 4      Hamstring flex from 90/90: R = 55 deg, L = 48 deg    See Exercise, Manual, and Modality Logs for complete treatment.     Assessment/Plan : Renate has made gains with decreased pain. L quad pain has resolved. She continues to have low back pain with prolonged standing that is chronic but standing tolerance starting to improve. She has made gains in B hip flex and L abduction. She will benefit from continued PT to progress core and B LE strength and B hamstring flexibility to further decrease pain and improve overall functional mobility with decreased fall risk.          Goals  Plan Goals: SHORT TERM GOALS: Time for Goal Achievement: 4 weeks  1.  Patient to be compliant and progression of HEP - MET                            2.  Pain level < 4/10  at worst with mentioned activities to improve function - Progressing  3.  No tenderness at L greater trochanter to allow for pt to lay on L side at night. - Progressing     LONG TERM GOALS: Time for Goal Achievement: 12 weeks  1.  Pt. to score < 15 % on Back Index  2.  Improve B hamstring flexibility to 60 deg for improved ability to bend forward.   3.  Improve 30 sec STS to 10 reps without use of hands for ease getting up from lower surfaces.  4.  (B) LE and lower abdominal strength to 5/5 to allow for pushing, pulling and activities to occur without pain (driving, sitting, household requirements)          Recommendations: Continue as planned  Timeframe: 6 weeks  Prognosis to achieve goals: good      Timed:         Manual Therapy:    10     mins  19845;     Therapeutic Exercise:    15     mins  34571;     Neuromuscular Damaris:    13    mins  20976;    Therapeutic Activity:          mins  37014;     Gait Training:           mins  32020;     Ultrasound:          mins  15058;    Ionto                                   mins   11816  Self Care                            mins   05795      Un-Timed:  Electrical Stimulation:    15     mins  43246 ( );  Dry Needling          mins self-pay  Traction          mins 81282  Canalith Repos         mins 29235      Timed Treatment:   38   mins   Total Treatment:     53   mins    PT Signature: Caroline De Jesus, PT, CLT  PT License: [place] Lic # [id number]

## 2025-05-06 ENCOUNTER — TREATMENT (OUTPATIENT)
Dept: PHYSICAL THERAPY | Facility: CLINIC | Age: 80
End: 2025-05-06
Payer: MEDICARE

## 2025-05-06 DIAGNOSIS — R26.81 UNSTEADINESS ON FEET: ICD-10-CM

## 2025-05-06 DIAGNOSIS — M54.42 CHRONIC BILATERAL LOW BACK PAIN WITH LEFT-SIDED SCIATICA: Primary | ICD-10-CM

## 2025-05-06 DIAGNOSIS — G89.29 CHRONIC BILATERAL LOW BACK PAIN WITH LEFT-SIDED SCIATICA: Primary | ICD-10-CM

## 2025-05-06 DIAGNOSIS — M25.552 PAIN OF LEFT HIP: ICD-10-CM

## 2025-05-06 DIAGNOSIS — R53.1 WEAKNESS GENERALIZED: ICD-10-CM

## 2025-05-06 PROCEDURE — 97110 THERAPEUTIC EXERCISES: CPT | Performed by: PHYSICAL THERAPIST

## 2025-05-06 PROCEDURE — 97112 NEUROMUSCULAR REEDUCATION: CPT | Performed by: PHYSICAL THERAPIST

## 2025-05-06 PROCEDURE — 97140 MANUAL THERAPY 1/> REGIONS: CPT | Performed by: PHYSICAL THERAPIST

## 2025-05-06 PROCEDURE — G0283 ELEC STIM OTHER THAN WOUND: HCPCS | Performed by: PHYSICAL THERAPIST

## 2025-05-06 NOTE — PROGRESS NOTES
Physical Therapy Daily Treatment Note      Patient: Renate Williamson   : 1945  Diagnosis/ICD-10 Code:  Chronic bilateral low back pain with left-sided sciatica [G89.29, M54.42]   Problems Addressed this Visit    None  Visit Diagnoses         Chronic bilateral low back pain with left-sided sciatica    -  Primary      Pain of left hip          Weakness generalized          Unsteadiness on feet              Diagnoses         Codes Comments      Chronic bilateral low back pain with left-sided sciatica    -  Primary ICD-10-CM: G89.29, M54.42  ICD-9-CM: 338.29, 724.2, 724.3       Pain of left hip     ICD-10-CM: M25.552  ICD-9-CM: 719.45       Weakness generalized     ICD-10-CM: R53.1  ICD-9-CM: 780.79       Unsteadiness on feet     ICD-10-CM: R26.81  ICD-9-CM: 781.2           Referring practitioner: PATRICIO Dorantes  Date of Initial Visit: Type: THERAPY  Noted: 2024  Today's Date: 2025    VISIT#: 21    Subjective : Renate reports she has noticed improvement in back pain with standing. States she has been more active at home. Still having some pain at night at outside of L thigh that starts at side of knee and goes up to hip (indicates lateral path to greater trochanter).    Objective : fatigued with leg press    See Exercise, Manual, and Modality Logs for complete treatment.     Assessment/Plan : Renate reporting improved tolerance to standing with decreased low back pain. Good tolerance to ther ex. Continued L ITB pain. Responds well to e-stim and MHP. She will benefit from continued PT to progress core and B LE strength and B hamstring flexibility to further decrease pain and improve overall functional mobility with decreased fall risk.          Goals  Plan Goals: SHORT TERM GOALS: Time for Goal Achievement: 4 weeks  1.  Patient to be compliant and progression of HEP - MET                            2.  Pain level < 4/10 at worst with mentioned activities to improve function - Progressing  3.  No  tenderness at L greater trochanter to allow for pt to lay on L side at night. - Progressing     LONG TERM GOALS: Time for Goal Achievement: 12 weeks  1.  Pt. to score < 15 % on Back Index  2.  Improve B hamstring flexibility to 60 deg for improved ability to bend forward.   3.  Improve 30 sec STS to 10 reps without use of hands for ease getting up from lower surfaces.  4.  (B) LE and lower abdominal strength to 5/5 to allow for pushing, pulling and activities to occur without pain (driving, sitting, household requirements)    Progress per Plan of Care and Progress strengthening /stabilization /functional activity         Timed:         Manual Therapy:    10     mins  82962;     Therapeutic Exercise:    15     mins  25021;     Neuromuscular Damaris:    13    mins  97218;    Therapeutic Activity:          mins  16534;     Gait Training:           mins  12868;     Ultrasound:          mins  99454;    Ionto                                   mins   25528  Self Care                            mins   75983    Un-Timed:  Electrical Stimulation:    15     mins  29863 ( );  Dry Needling          mins 00431/08225  Traction          mins 70221  Canalith Repos                   mins  11605  Low Eval          Mins  14023  Mod Eval          Mins  54361  High Eval                            Mins  79517  Re-Eval                               mins  04001    Timed Treatment:   38   mins   Total Treatment:     53   mins    Caroline De Jesus, PT, CLT  Physical Therapist  [place] Lic # [id number]

## 2025-05-20 ENCOUNTER — TREATMENT (OUTPATIENT)
Dept: PHYSICAL THERAPY | Facility: CLINIC | Age: 80
End: 2025-05-20
Payer: MEDICARE

## 2025-05-20 DIAGNOSIS — R26.81 UNSTEADINESS ON FEET: ICD-10-CM

## 2025-05-20 DIAGNOSIS — G89.29 CHRONIC BILATERAL LOW BACK PAIN WITH LEFT-SIDED SCIATICA: Primary | ICD-10-CM

## 2025-05-20 DIAGNOSIS — R53.1 WEAKNESS GENERALIZED: ICD-10-CM

## 2025-05-20 DIAGNOSIS — M54.42 CHRONIC BILATERAL LOW BACK PAIN WITH LEFT-SIDED SCIATICA: Primary | ICD-10-CM

## 2025-05-20 DIAGNOSIS — M25.552 PAIN OF LEFT HIP: ICD-10-CM

## 2025-05-20 PROCEDURE — 97110 THERAPEUTIC EXERCISES: CPT | Performed by: PHYSICAL THERAPIST

## 2025-05-20 PROCEDURE — 97140 MANUAL THERAPY 1/> REGIONS: CPT | Performed by: PHYSICAL THERAPIST

## 2025-05-20 PROCEDURE — G0283 ELEC STIM OTHER THAN WOUND: HCPCS | Performed by: PHYSICAL THERAPIST

## 2025-05-20 NOTE — PROGRESS NOTES
Physical Therapy Daily Treatment Note      Patient: Renate Williamson   : 1945  Diagnosis/ICD-10 Code:  Chronic bilateral low back pain with left-sided sciatica [G89.29, M54.42]   Problems Addressed this Visit    None  Visit Diagnoses         Chronic bilateral low back pain with left-sided sciatica    -  Primary      Pain of left hip          Weakness generalized          Unsteadiness on feet              Diagnoses         Codes Comments      Chronic bilateral low back pain with left-sided sciatica    -  Primary ICD-10-CM: G89.29, M54.42  ICD-9-CM: 338.29, 724.2, 724.3       Pain of left hip     ICD-10-CM: M25.552  ICD-9-CM: 719.45       Weakness generalized     ICD-10-CM: R53.1  ICD-9-CM: 780.79       Unsteadiness on feet     ICD-10-CM: R26.81  ICD-9-CM: 781.2           Referring practitioner: PATRICIO Dorantes  Date of Initial Visit: Type: THERAPY  Noted: 2024  Today's Date: 2025    VISIT#: 22    Subjective : Renate reports that her back is feeling about the same. States her legs are feeling tight in the back.     Objective : added deadlift with water tank.     See Exercise, Manual, and Modality Logs for complete treatment.     Assessment/Plan : Increased B hamstring tightness at start of session. Good tolerance to ther ex progression with no reports of pain. Responds well to e-stim and MHP. She will benefit from continued PT to progress core and B LE strength and B hamstring flexibility to further decrease pain and improve overall functional mobility with decreased fall risk.          Goals  Plan Goals: SHORT TERM GOALS: Time for Goal Achievement: 4 weeks  1.  Patient to be compliant and progression of HEP - MET                            2.  Pain level < 4/10 at worst with mentioned activities to improve function - Progressing  3.  No tenderness at L greater trochanter to allow for pt to lay on L side at night. - Progressing     LONG TERM GOALS: Time for Goal Achievement: 12 weeks  1.  Pt. to  score < 15 % on Back Index  2.  Improve B hamstring flexibility to 60 deg for improved ability to bend forward.   3.  Improve 30 sec STS to 10 reps without use of hands for ease getting up from lower surfaces.  4.  (B) LE and lower abdominal strength to 5/5 to allow for pushing, pulling and activities to occur without pain (driving, sitting, household requirements)    Progress per Plan of Care and Progress strengthening /stabilization /functional activity         Timed:         Manual Therapy:    15     mins  88651;     Therapeutic Exercise:    15     mins  63119;     Neuromuscular Damaris:        mins  43824;    Therapeutic Activity:          mins  73306;     Gait Training:           mins  29217;     Ultrasound:          mins  27895;    Ionto                                   mins   36047  Self Care                            mins   35301    Un-Timed:  Electrical Stimulation:    15     mins  82191 ( );  Dry Needling          mins 90369/48047  Traction          mins 07149  Canalith Repos                   mins  53197  Low Eval          Mins  03061  Mod Eval          Mins  80111  High Eval                            Mins  19301  Re-Eval                               mins  00679    Timed Treatment:   30   mins   Total Treatment:     45   mins    Caroline De Jesus PT, CLT  Physical Therapist  [place] Lic # [id number]

## 2025-05-27 ENCOUNTER — TREATMENT (OUTPATIENT)
Dept: PHYSICAL THERAPY | Facility: CLINIC | Age: 80
End: 2025-05-27
Payer: MEDICARE

## 2025-05-27 DIAGNOSIS — M54.42 CHRONIC BILATERAL LOW BACK PAIN WITH LEFT-SIDED SCIATICA: Primary | ICD-10-CM

## 2025-05-27 DIAGNOSIS — M25.552 PAIN OF LEFT HIP: ICD-10-CM

## 2025-05-27 DIAGNOSIS — G89.29 CHRONIC BILATERAL LOW BACK PAIN WITH LEFT-SIDED SCIATICA: Primary | ICD-10-CM

## 2025-05-27 DIAGNOSIS — R53.1 WEAKNESS GENERALIZED: ICD-10-CM

## 2025-05-27 DIAGNOSIS — R26.81 UNSTEADINESS ON FEET: ICD-10-CM

## 2025-05-27 PROCEDURE — 97110 THERAPEUTIC EXERCISES: CPT | Performed by: PHYSICAL THERAPIST

## 2025-05-27 PROCEDURE — 97140 MANUAL THERAPY 1/> REGIONS: CPT | Performed by: PHYSICAL THERAPIST

## 2025-05-27 PROCEDURE — 97112 NEUROMUSCULAR REEDUCATION: CPT | Performed by: PHYSICAL THERAPIST

## 2025-05-27 PROCEDURE — G0283 ELEC STIM OTHER THAN WOUND: HCPCS | Performed by: PHYSICAL THERAPIST

## 2025-05-27 NOTE — PROGRESS NOTES
Physical Therapy Daily Treatment Note      Patient: Renate Williamson   : 1945  Diagnosis/ICD-10 Code:  Chronic bilateral low back pain with left-sided sciatica [G89.29, M54.42]   Problems Addressed this Visit    None  Visit Diagnoses         Chronic bilateral low back pain with left-sided sciatica    -  Primary      Pain of left hip          Weakness generalized          Unsteadiness on feet              Diagnoses         Codes Comments      Chronic bilateral low back pain with left-sided sciatica    -  Primary ICD-10-CM: G89.29, M54.42  ICD-9-CM: 338.29, 724.2, 724.3       Pain of left hip     ICD-10-CM: M25.552  ICD-9-CM: 719.45       Weakness generalized     ICD-10-CM: R53.1  ICD-9-CM: 780.79       Unsteadiness on feet     ICD-10-CM: R26.81  ICD-9-CM: 781.2           Referring practitioner: PATRICIO Dorantes  Date of Initial Visit: Type: THERAPY  Noted: 2024  Today's Date: 2025    VISIT#: 23    Subjective : Renate reports she had an uncomfortable night with pain in L low back and L thigh. States she is having improved stamina when cooking and has been in the kitchen a lot.    Objective : Added standing B shoulder flex with back to wall.    See Exercise, Manual, and Modality Logs for complete treatment.     Assessment/Plan : Decreased pain at start of session with improved standing tolerance while cooking. She will benefit from continued core and lumbar strengthening to further decrease pain and improve overall functional mobility with decreased fall risk.     Goals  Plan Goals: SHORT TERM GOALS: Time for Goal Achievement: 4 weeks  1.  Patient to be compliant and progression of HEP - MET                            2.  Pain level < 4/10 at worst with mentioned activities to improve function - Progressing  3.  No tenderness at L greater trochanter to allow for pt to lay on L side at night. - Progressing     LONG TERM GOALS: Time for Goal Achievement: 12 weeks  1.  Pt. to score < 15 % on Back  Index  2.  Improve B hamstring flexibility to 60 deg for improved ability to bend forward.   3.  Improve 30 sec STS to 10 reps without use of hands for ease getting up from lower surfaces.  4.  (B) LE and lower abdominal strength to 5/5 to allow for pushing, pulling and activities to occur without pain (driving, sitting, household requirements)    Progress per Plan of Care and Progress strengthening /stabilization /functional activity      Timed:         Manual Therapy:    15     mins  31788;     Therapeutic Exercise:    15     mins  49816;     Neuromuscular Damaris:    8    mins  73107;    Therapeutic Activity:          mins  80130;     Gait Training:           mins  05173;     Ultrasound:          mins  02921;    Ionto                                   mins   10348  Self Care                            mins   18363    Un-Timed:  Electrical Stimulation:    15     mins  16676 ( );  Dry Needling          mins 13231/16071  Traction          mins 84296  Canalith Repos                   mins  72578  Low Eval          Mins  44068  Mod Eval          Mins  74174  High Eval                            Mins  37106  Re-Eval                               mins  70338    Timed Treatment:   38   mins   Total Treatment:     53   mins    Caroline De Jesus PT, CLT  Physical Therapist  [place] Lic # [id number]

## 2025-06-03 ENCOUNTER — TREATMENT (OUTPATIENT)
Dept: PHYSICAL THERAPY | Facility: CLINIC | Age: 80
End: 2025-06-03
Payer: MEDICARE

## 2025-06-03 DIAGNOSIS — M25.552 PAIN OF LEFT HIP: ICD-10-CM

## 2025-06-03 DIAGNOSIS — R26.81 UNSTEADINESS ON FEET: ICD-10-CM

## 2025-06-03 DIAGNOSIS — R53.1 WEAKNESS GENERALIZED: ICD-10-CM

## 2025-06-03 DIAGNOSIS — G89.29 CHRONIC BILATERAL LOW BACK PAIN WITH LEFT-SIDED SCIATICA: Primary | ICD-10-CM

## 2025-06-03 DIAGNOSIS — M54.42 CHRONIC BILATERAL LOW BACK PAIN WITH LEFT-SIDED SCIATICA: Primary | ICD-10-CM

## 2025-06-03 PROCEDURE — 97140 MANUAL THERAPY 1/> REGIONS: CPT | Performed by: PHYSICAL THERAPIST

## 2025-06-03 PROCEDURE — 97110 THERAPEUTIC EXERCISES: CPT | Performed by: PHYSICAL THERAPIST

## 2025-06-03 PROCEDURE — G0283 ELEC STIM OTHER THAN WOUND: HCPCS | Performed by: PHYSICAL THERAPIST

## 2025-06-03 NOTE — PROGRESS NOTES
Re-Assessment / Re-Certification  [address] [place] [address] , [address], [place] [address]        Patient: Renate Williamson   : 1945  Diagnosis/ICD-10 Code:  Chronic bilateral low back pain with left-sided sciatica [G89.29, M54.42]  Referring practitioner: PATRICIO Dorantes  Date of Initial Visit: Type: THERAPY  Noted: 2024  Today's Date: 6/3/2025  Patient seen for 24 sessions      Subjective:   Renate Williamson reports: she is feeling better overall. Tolerating standing to cook longer. Still has some back pain with prolonged standing. Pain after replanting some flowers. States she tends to bend over while planting so that she can get to things she needs easier.   Subjective Questionnaire: not completed  Clinical Progress: improved  Home Program Compliance: Yes  Treatment has included: therapeutic exercise, neuromuscular re-education, manual therapy, therapeutic activity, gait training, electrical stimulation, and moist heat    Objective :    Strength/Myotome Testing      Left Hip   Planes of Motion   Flexion: 4 to 4+  Abduction: 4     Right Hip   Planes of Motion   Flexion: 4+  Abduction: 4- to 4      Hamstring flex from 90/90: R = 55 deg, L = 48 deg    Assessment/Plan : Decreased pain at start of session with improved standing tolerance while cooking. She has made gains in B hip flex and L abduction. She will benefit from continued core and lumbar strengthening to further decrease pain and improve overall functional mobility with decreased fall risk.      Progress toward previous goals: Partially Met    Goals  Plan Goals: SHORT TERM GOALS: Time for Goal Achievement: 4 weeks  1.  Patient to be compliant and progression of HEP - MET                            2.  Pain level < 4/10 at worst with mentioned activities to improve function - Progressing  3.  No tenderness at L greater trochanter to allow for pt to lay on L side at night. - MET     LONG TERM GOALS: Time for Goal Achievement: 12 weeks  1.  Pt. to score < 15 %  on Back Index  2.  Improve B hamstring flexibility to 60 deg for improved ability to bend forward. - Progressing  3.  Improve 30 sec STS to 10 reps without use of hands for ease getting up from lower surfaces. - Not met  4.  (B) LE and lower abdominal strength to 5/5 to allow for pushing, pulling and activities to occur without pain (driving, sitting, household requirements) - Progressing    New Goals  Short-term goals (STG): Pt to perform dead lift and squat with 10# with good form for ease retrieving objects from the ground.  Long-term goals (LTG): will continue to work toward established goals.      Recommendations: Continue as planned  Timeframe: 3 months  Frequency: 1x/wk  Prognosis to achieve goals: good    PT Signature: Caroline De Jesus PT, CLT  Physical Therapist  [place] Lic # [id number]  Electronically signed by Caroline De Jesus PT, 06/03/25, 3:14 PM EDT    Certification Period: 6/9/2025 thru 9/ I certify that the therapy services are furnished while this patient is under my care. The services outlined above are required by this patient and will be reviewed every 90 days.    PHYSICIAN: Kayley Livingston APRN _____________________________________________________________  NPI: [id number]                                      DATE:    ___________________________________________      Timed:         Manual Therapy:    10     mins  01404;     Therapeutic Exercise:    15     mins  41282;     Neuromuscular Damaris:        mins  51262;    Therapeutic Activity:          mins  37897;     Gait Training:           mins  12557;     Ultrasound:          mins  88933;    Ionto                                   mins   45162  Self Care                            mins   79043    Un-Timed:  Electrical Stimulation:    15     mins  93618 ( );  Dry Needling          mins 30923/69308  Traction          mins 19322  Can Repos          mins 09012  Re-Eval                               mins  42079      Timed Treatment:   25   mins   Total  Treatment:     40   mins

## 2025-06-10 ENCOUNTER — TREATMENT (OUTPATIENT)
Dept: PHYSICAL THERAPY | Facility: CLINIC | Age: 80
End: 2025-06-10
Payer: MEDICARE

## 2025-06-10 DIAGNOSIS — R53.1 WEAKNESS GENERALIZED: ICD-10-CM

## 2025-06-10 DIAGNOSIS — M25.552 PAIN OF LEFT HIP: ICD-10-CM

## 2025-06-10 DIAGNOSIS — M54.42 CHRONIC BILATERAL LOW BACK PAIN WITH LEFT-SIDED SCIATICA: Primary | ICD-10-CM

## 2025-06-10 DIAGNOSIS — R26.81 UNSTEADINESS ON FEET: ICD-10-CM

## 2025-06-10 DIAGNOSIS — G89.29 CHRONIC BILATERAL LOW BACK PAIN WITH LEFT-SIDED SCIATICA: Primary | ICD-10-CM

## 2025-06-10 PROCEDURE — G0283 ELEC STIM OTHER THAN WOUND: HCPCS | Performed by: PHYSICAL THERAPIST

## 2025-06-10 PROCEDURE — 97110 THERAPEUTIC EXERCISES: CPT | Performed by: PHYSICAL THERAPIST

## 2025-06-10 PROCEDURE — 97140 MANUAL THERAPY 1/> REGIONS: CPT | Performed by: PHYSICAL THERAPIST

## 2025-06-10 NOTE — PROGRESS NOTES
Physical Therapy Daily Treatment Note      Patient: Renate Williamson   : 1945  Diagnosis/ICD-10 Code:  Chronic bilateral low back pain with left-sided sciatica [G89.29, M54.42]   Problems Addressed this Visit    None  Visit Diagnoses         Chronic bilateral low back pain with left-sided sciatica    -  Primary      Pain of left hip          Weakness generalized          Unsteadiness on feet              Diagnoses         Codes Comments      Chronic bilateral low back pain with left-sided sciatica    -  Primary ICD-10-CM: G89.29, M54.42  ICD-9-CM: 338.29, 724.2, 724.3       Pain of left hip     ICD-10-CM: M25.552  ICD-9-CM: 719.45       Weakness generalized     ICD-10-CM: R53.1  ICD-9-CM: 780.79       Unsteadiness on feet     ICD-10-CM: R26.81  ICD-9-CM: 781.2           Referring practitioner: PATRICIO Dorantes  Date of Initial Visit: Type: THERAPY  Noted: 2024  Today's Date: 6/10/2025    VISIT#: 25    Subjective : Renate reports she has been having some pain in L upper leg (indicates lateral aspect of proximal HS and proximal ITB.     Objective :     See Exercise, Manual, and Modality Logs for complete treatment.     Assessment/Plan : Increased tightness noted L HS compared to last week. Good tolerance to ther ex with no increase in symptoms. She is responding well to postural and core strengthening.     Goals  Plan Goals: SHORT TERM GOALS: Time for Goal Achievement: 4 weeks  1.  Patient to be compliant and progression of HEP - MET                            2.  Pain level < 4/10 at worst with mentioned activities to improve function - Progressing  3.  No tenderness at L greater trochanter to allow for pt to lay on L side at night. - MET     LONG TERM GOALS: Time for Goal Achievement: 12 weeks  1.  Pt. to score < 15 % on Back Index  2.  Improve B hamstring flexibility to 60 deg for improved ability to bend forward. - Progressing  3.  Improve 30 sec STS to 10 reps without use of hands for ease getting up  from lower surfaces. - Not met  4.  (B) LE and lower abdominal strength to 5/5 to allow for pushing, pulling and activities to occur without pain (driving, sitting, household requirements) - Progressing    Progress per Plan of Care and Progress strengthening /stabilization /functional activity         Timed:         Manual Therapy:    10     mins  77290;     Therapeutic Exercise:    20     mins  86897;     Neuromuscular Damaris:        mins  71706;    Therapeutic Activity:          mins  94003;     Gait Training:           mins  29169;     Ultrasound:          mins  56610;    Ionto                                   mins   48086  Self Care                            mins   77073    Un-Timed:  Electrical Stimulation:    15     mins  23733 ( );  Dry Needling          mins 19049/60327  Traction          mins 85408  Canalith Repos                   mins  90432  Low Eval          Mins  34366  Mod Eval          Mins  50733  High Eval                            Mins  35863  Re-Eval                               mins  17314    Timed Treatment:   30   mins   Total Treatment:     45   mins    Caroline De Jesus PT, CLT  Physical Therapist  [place] Lic # [id number]

## 2025-06-17 ENCOUNTER — TREATMENT (OUTPATIENT)
Dept: PHYSICAL THERAPY | Facility: CLINIC | Age: 80
End: 2025-06-17
Payer: MEDICARE

## 2025-06-17 DIAGNOSIS — R53.1 WEAKNESS GENERALIZED: ICD-10-CM

## 2025-06-17 DIAGNOSIS — R26.81 UNSTEADINESS ON FEET: ICD-10-CM

## 2025-06-17 DIAGNOSIS — M25.552 PAIN OF LEFT HIP: ICD-10-CM

## 2025-06-17 DIAGNOSIS — G89.29 CHRONIC BILATERAL LOW BACK PAIN WITH LEFT-SIDED SCIATICA: Primary | ICD-10-CM

## 2025-06-17 DIAGNOSIS — M54.42 CHRONIC BILATERAL LOW BACK PAIN WITH LEFT-SIDED SCIATICA: Primary | ICD-10-CM

## 2025-06-17 PROCEDURE — G0283 ELEC STIM OTHER THAN WOUND: HCPCS | Performed by: PHYSICAL THERAPIST

## 2025-06-17 PROCEDURE — 97140 MANUAL THERAPY 1/> REGIONS: CPT | Performed by: PHYSICAL THERAPIST

## 2025-06-17 PROCEDURE — 97112 NEUROMUSCULAR REEDUCATION: CPT | Performed by: PHYSICAL THERAPIST

## 2025-06-17 PROCEDURE — 97110 THERAPEUTIC EXERCISES: CPT | Performed by: PHYSICAL THERAPIST

## 2025-06-17 NOTE — PROGRESS NOTES
Physical Therapy Daily Treatment Note      Patient: Renate Williamson   : 1945  Diagnosis/ICD-10 Code:  Chronic bilateral low back pain with left-sided sciatica [G89.29, M54.42]   Problems Addressed this Visit    None  Visit Diagnoses         Chronic bilateral low back pain with left-sided sciatica    -  Primary      Pain of left hip          Weakness generalized          Unsteadiness on feet              Diagnoses         Codes Comments      Chronic bilateral low back pain with left-sided sciatica    -  Primary ICD-10-CM: G89.29, M54.42  ICD-9-CM: 338.29, 724.2, 724.3       Pain of left hip     ICD-10-CM: M25.552  ICD-9-CM: 719.45       Weakness generalized     ICD-10-CM: R53.1  ICD-9-CM: 780.79       Unsteadiness on feet     ICD-10-CM: R26.81  ICD-9-CM: 781.2           Referring practitioner: PATRICIO Dorantes  Date of Initial Visit: Type: THERAPY  Noted: 2024  Today's Date: 2025    VISIT#: 26    Subjective : Pt reports [facility] [employer] [facility] [place] and stood for ~1 hr. Then only able to march a few blocks and had to stop due to leg and back pain.    Objective : B hamstring tightness, L>R    See Exercise, Manual, and Modality Logs for complete treatment.     Assessment/Plan : Increased pain and muscle tightness at start of session related to being on her feet for a couple hours at rally over the weekend. Held some exercise this date due to increased soreness. Good tolerance to manual techniques and modalities with decreased pain reported post-tx.    Goals  Plan Goals: SHORT TERM GOALS: Time for Goal Achievement: 4 weeks  1.  Patient to be compliant and progression of HEP - MET                            2.  Pain level < 4/10 at worst with mentioned activities to improve function - Progressing  3.  No tenderness at L greater trochanter to allow for pt to lay on L side at night. - MET     LONG TERM GOALS: Time for Goal Achievement: 12 weeks  1.  Pt. to score < 15 % on Back Index  2.  Improve B hamstring  flexibility to 60 deg for improved ability to bend forward. - Progressing  3.  Improve 30 sec STS to 10 reps without use of hands for ease getting up from lower surfaces. - Not met  4.  (B) LE and lower abdominal strength to 5/5 to allow for pushing, pulling and activities to occur without pain (driving, sitting, household requirements) - Progressing    Progress per Plan of Care and Progress strengthening /stabilization /functional activity         Timed:         Manual Therapy:    15     mins  13499;     Therapeutic Exercise:    15     mins  64613;     Neuromuscular Damaris:    8    mins  04603;    Therapeutic Activity:          mins  22767;     Gait Training:           mins  07763;     Ultrasound:          mins  80195;    Ionto                                   mins   75179  Self Care                            mins   78982    Un-Timed:  Electrical Stimulation:    15     mins  16833 ( );  Dry Needling          mins 01516/62701  Traction          mins 93143  Canalith Repos                   mins  40275  Low Eval          Mins  69747  Mod Eval          Mins  95103  High Eval                            Mins  18315  Re-Eval                               mins  04951    Timed Treatment:   38   mins   Total Treatment:     53   mins    Caroline De Jesus PT, CLT  Physical Therapist  [place] Lic # [id number]

## 2025-06-24 ENCOUNTER — TREATMENT (OUTPATIENT)
Dept: PHYSICAL THERAPY | Facility: CLINIC | Age: 80
End: 2025-06-24
Payer: MEDICARE

## 2025-06-24 DIAGNOSIS — R26.81 UNSTEADINESS ON FEET: ICD-10-CM

## 2025-06-24 DIAGNOSIS — G89.29 CHRONIC BILATERAL LOW BACK PAIN WITH LEFT-SIDED SCIATICA: Primary | ICD-10-CM

## 2025-06-24 DIAGNOSIS — M25.552 PAIN OF LEFT HIP: ICD-10-CM

## 2025-06-24 DIAGNOSIS — M54.42 CHRONIC BILATERAL LOW BACK PAIN WITH LEFT-SIDED SCIATICA: Primary | ICD-10-CM

## 2025-06-24 DIAGNOSIS — R53.1 WEAKNESS GENERALIZED: ICD-10-CM

## 2025-06-24 PROCEDURE — 97140 MANUAL THERAPY 1/> REGIONS: CPT | Performed by: PHYSICAL THERAPIST

## 2025-06-24 PROCEDURE — 97110 THERAPEUTIC EXERCISES: CPT | Performed by: PHYSICAL THERAPIST

## 2025-06-24 PROCEDURE — G0283 ELEC STIM OTHER THAN WOUND: HCPCS | Performed by: PHYSICAL THERAPIST

## 2025-06-24 NOTE — PROGRESS NOTES
Physical Therapy Daily Treatment Note      Patient: Renate Williamson   : 1945  Diagnosis/ICD-10 Code:  Chronic bilateral low back pain with left-sided sciatica [G89.29, M54.42]   Problems Addressed this Visit    None  Visit Diagnoses         Chronic bilateral low back pain with left-sided sciatica    -  Primary      Pain of left hip          Weakness generalized          Unsteadiness on feet              Diagnoses         Codes Comments      Chronic bilateral low back pain with left-sided sciatica    -  Primary ICD-10-CM: G89.29, M54.42  ICD-9-CM: 338.29, 724.2, 724.3       Pain of left hip     ICD-10-CM: M25.552  ICD-9-CM: 719.45       Weakness generalized     ICD-10-CM: R53.1  ICD-9-CM: 780.79       Unsteadiness on feet     ICD-10-CM: R26.81  ICD-9-CM: 781.2           Referring practitioner: PATRICIO Dorantes  Date of Initial Visit: Type: THERAPY  Noted: 2024  Today's Date: 2025    VISIT#: 27    Subjective : Renate reports that she is not doing good today. Having pain down side of L thigh and L knee was screaming at her today when going down stairs.     Objective : increased tenderness along L ITB.    See Exercise, Manual, and Modality Logs for complete treatment.     Assessment/Plan : Increased pain at start of session with increased L ITB pain. Good tolerance to manual techniques and gentle ther ex. Decreased pain reported post-tx with MHP and e-stim.     Goals  Plan Goals: SHORT TERM GOALS: Time for Goal Achievement: 4 weeks  1.  Patient to be compliant and progression of HEP - MET                            2.  Pain level < 4/10 at worst with mentioned activities to improve function - Progressing  3.  No tenderness at L greater trochanter to allow for pt to lay on L side at night. - MET     LONG TERM GOALS: Time for Goal Achievement: 12 weeks  1.  Pt. to score < 15 % on Back Index  2.  Improve B hamstring flexibility to 60 deg for improved ability to bend forward. - Progressing  3.  Improve 30  sec STS to 10 reps without use of hands for ease getting up from lower surfaces. - Not met  4.  (B) LE and lower abdominal strength to 5/5 to allow for pushing, pulling and activities to occur without pain (driving, sitting, household requirements) - Progressing    Progress per Plan of Care and Progress strengthening /stabilization /functional activity         Timed:         Manual Therapy:    15     mins  33425;     Therapeutic Exercise:    10     mins  64472;     Neuromuscular Damaris:        mins  73398;    Therapeutic Activity:          mins  14648;     Gait Training:           mins  11926;     Ultrasound:          mins  62365;    Ionto                                   mins   52791  Self Care                            mins   67029    Un-Timed:  Electrical Stimulation:    15     mins  75443 ( );  Dry Needling          mins 89091/90416  Traction          mins 78115  Canalith Repos                   mins  31576  Low Eval          Mins  80237  Mod Eval          Mins  50052  High Eval                            Mins  24412  Re-Eval                               mins  95068    Timed Treatment:   25   mins   Total Treatment:     40   mins    Caroline De Jesus PT, CLT  Physical Therapist  [place] Lic # [id number]

## 2025-07-01 ENCOUNTER — TREATMENT (OUTPATIENT)
Dept: PHYSICAL THERAPY | Facility: CLINIC | Age: 80
End: 2025-07-01
Payer: MEDICARE

## 2025-07-01 DIAGNOSIS — R26.81 UNSTEADINESS ON FEET: ICD-10-CM

## 2025-07-01 DIAGNOSIS — R53.1 WEAKNESS GENERALIZED: ICD-10-CM

## 2025-07-01 DIAGNOSIS — G89.29 CHRONIC BILATERAL LOW BACK PAIN WITH LEFT-SIDED SCIATICA: Primary | ICD-10-CM

## 2025-07-01 DIAGNOSIS — M25.552 PAIN OF LEFT HIP: ICD-10-CM

## 2025-07-01 DIAGNOSIS — M54.42 CHRONIC BILATERAL LOW BACK PAIN WITH LEFT-SIDED SCIATICA: Primary | ICD-10-CM

## 2025-07-01 PROCEDURE — 97140 MANUAL THERAPY 1/> REGIONS: CPT | Performed by: PHYSICAL THERAPIST

## 2025-07-01 PROCEDURE — G0283 ELEC STIM OTHER THAN WOUND: HCPCS | Performed by: PHYSICAL THERAPIST

## 2025-07-01 PROCEDURE — 97110 THERAPEUTIC EXERCISES: CPT | Performed by: PHYSICAL THERAPIST

## 2025-07-01 NOTE — PROGRESS NOTES
Physical Therapy Daily Treatment Note      Patient: Renate Williamson   : 1945  Diagnosis/ICD-10 Code:  Chronic bilateral low back pain with left-sided sciatica [G89.29, M54.42]   Problems Addressed this Visit    None  Visit Diagnoses         Chronic bilateral low back pain with left-sided sciatica    -  Primary      Pain of left hip          Weakness generalized          Unsteadiness on feet              Diagnoses         Codes Comments      Chronic bilateral low back pain with left-sided sciatica    -  Primary ICD-10-CM: G89.29, M54.42  ICD-9-CM: 338.29, 724.2, 724.3       Pain of left hip     ICD-10-CM: M25.552  ICD-9-CM: 719.45       Weakness generalized     ICD-10-CM: R53.1  ICD-9-CM: 780.79       Unsteadiness on feet     ICD-10-CM: R26.81  ICD-9-CM: 781.2           Referring practitioner: PATRICIO Dorantes  Date of Initial Visit: Type: THERAPY  Noted: 2024  Today's Date: 2025    VISIT#: 28    Subjective : Renate reports that back and L leg are not as sore this week.    Objective :    See Exercise, Manual, and Modality Logs for complete treatment.     Assessment/Plan : Decreased pain at start of session. Good tolerance to manual techniques and ther ex. Tolerated leg press with no increase in symptoms. Decreased pain reported post-tx with MHP and e-stim.      Goals  Plan Goals: SHORT TERM GOALS: Time for Goal Achievement: 4 weeks  1.  Patient to be compliant and progression of HEP - MET                            2.  Pain level < 4/10 at worst with mentioned activities to improve function - Progressing  3.  No tenderness at L greater trochanter to allow for pt to lay on L side at night. - MET     LONG TERM GOALS: Time for Goal Achievement: 12 weeks  1.  Pt. to score < 15 % on Back Index  2.  Improve B hamstring flexibility to 60 deg for improved ability to bend forward. - Progressing  3.  Improve 30 sec STS to 10 reps without use of hands for ease getting up from lower surfaces. - Not met  4.  (B)  LE and lower abdominal strength to 5/5 to allow for pushing, pulling and activities to occur without pain (driving, sitting, household requirements) - Progressing    Progress per Plan of Care and Progress strengthening /stabilization /functional activity      Timed:         Manual Therapy:    15     mins  48370;     Therapeutic Exercise:    15     mins  52511;     Neuromuscular Damaris:        mins  48893;    Therapeutic Activity:          mins  45436;     Gait Training:           mins  80018;     Ultrasound:          mins  20210;    Ionto                                   mins   48439  Self Care                            mins   91126    Un-Timed:  Electrical Stimulation:    15     mins  32130 ( );  Dry Needling          mins 18703/07584  Traction          mins 83787  Canalith Repos                   mins  14073  Low Eval          Mins  40223  Mod Eval          Mins  98315  High Eval                            Mins  42650  Re-Eval                               mins  91869    Timed Treatment:   30   mins   Total Treatment:     45   mins    Caroline De Jesus PT, CLT  Physical Therapist  [place] Lic # [id number]

## 2025-07-11 ENCOUNTER — TREATMENT (OUTPATIENT)
Dept: PHYSICAL THERAPY | Facility: CLINIC | Age: 80
End: 2025-07-11
Payer: MEDICARE

## 2025-07-11 DIAGNOSIS — M54.42 CHRONIC BILATERAL LOW BACK PAIN WITH LEFT-SIDED SCIATICA: Primary | ICD-10-CM

## 2025-07-11 DIAGNOSIS — R26.81 UNSTEADINESS ON FEET: ICD-10-CM

## 2025-07-11 DIAGNOSIS — M25.552 PAIN OF LEFT HIP: ICD-10-CM

## 2025-07-11 DIAGNOSIS — G89.29 CHRONIC BILATERAL LOW BACK PAIN WITH LEFT-SIDED SCIATICA: Primary | ICD-10-CM

## 2025-07-11 DIAGNOSIS — R53.1 WEAKNESS GENERALIZED: ICD-10-CM

## 2025-07-11 PROCEDURE — G0283 ELEC STIM OTHER THAN WOUND: HCPCS | Performed by: PHYSICAL THERAPIST

## 2025-07-11 PROCEDURE — 97110 THERAPEUTIC EXERCISES: CPT | Performed by: PHYSICAL THERAPIST

## 2025-07-11 PROCEDURE — 97140 MANUAL THERAPY 1/> REGIONS: CPT | Performed by: PHYSICAL THERAPIST

## 2025-07-11 NOTE — PROGRESS NOTES
Physical Therapy Daily Treatment Note      Patient: Renate Williamson   : 1945  Diagnosis/ICD-10 Code:  Chronic bilateral low back pain with left-sided sciatica [G89.29, M54.42]   Problems Addressed this Visit    None  Visit Diagnoses         Chronic bilateral low back pain with left-sided sciatica    -  Primary      Pain of left hip          Weakness generalized          Unsteadiness on feet              Diagnoses         Codes Comments      Chronic bilateral low back pain with left-sided sciatica    -  Primary ICD-10-CM: G89.29, M54.42  ICD-9-CM: 338.29, 724.2, 724.3       Pain of left hip     ICD-10-CM: M25.552  ICD-9-CM: 719.45       Weakness generalized     ICD-10-CM: R53.1  ICD-9-CM: 780.79       Unsteadiness on feet     ICD-10-CM: R26.81  ICD-9-CM: 781.2           Referring practitioner: PATRICIO Dorantes  Date of Initial Visit: Type: THERAPY  Noted: 2024  Today's Date: 2025    VISIT#: 29    Subjective : Renate reports having some tightness and pain in side of L thigh (indicates ITB and lateral hamstring).    Objective :     See Exercise, Manual, and Modality Logs for complete treatment.      Assessment/Plan: Tightness present B hamstrings, R>L. Good tolerance to manual techniques and ther ex. LE fatigue reported with leg press. Decreased pain reported post-tx with MHP and e-stim. Will continue to progress ther ex as tolerated.     Goals  Plan Goals: SHORT TERM GOALS: Time for Goal Achievement: 4 weeks  1.  Patient to be compliant and progression of HEP - MET                            2.  Pain level < 4/10 at worst with mentioned activities to improve function - Progressing  3.  No tenderness at L greater trochanter to allow for pt to lay on L side at night. - MET     LONG TERM GOALS: Time for Goal Achievement: 12 weeks  1.  Pt. to score < 15 % on Back Index  2.  Improve B hamstring flexibility to 60 deg for improved ability to bend forward. - Progressing  3.  Improve 30 sec STS to 10 reps  without use of hands for ease getting up from lower surfaces. - Not met  4.  (B) LE and lower abdominal strength to 5/5 to allow for pushing, pulling and activities to occur without pain (driving, sitting, household requirements) - Progressing    Progress per Plan of Care and Progress strengthening /stabilization /functional activity         Timed:         Manual Therapy:    10     mins  47388;     Therapeutic Exercise:    15     mins  11267;     Neuromuscular Damaris:        mins  47686;    Therapeutic Activity:          mins  51714;     Gait Training:           mins  24937;     Ultrasound:          mins  78936;    Ionto                                   mins   65357  Self Care                            mins   12321    Un-Timed:  Electrical Stimulation:    15     mins  22623 ( );  Dry Needling          mins 70076/88079  Traction          mins 25442  Canalith Repos                   mins  65879  Low Eval          Mins  30291  Mod Eval          Mins  37733  High Eval                            Mins  37543  Re-Eval                               mins  57512    Timed Treatment:   25   mins   Total Treatment:     40   mins    Caroline De Jesus PT, CLT  Physical Therapist  [place] Lic # [id number]

## 2025-07-15 ENCOUNTER — TREATMENT (OUTPATIENT)
Dept: PHYSICAL THERAPY | Facility: CLINIC | Age: 80
End: 2025-07-15
Payer: MEDICARE

## 2025-07-15 DIAGNOSIS — M54.42 CHRONIC BILATERAL LOW BACK PAIN WITH LEFT-SIDED SCIATICA: Primary | ICD-10-CM

## 2025-07-15 DIAGNOSIS — G89.29 CHRONIC BILATERAL LOW BACK PAIN WITH LEFT-SIDED SCIATICA: Primary | ICD-10-CM

## 2025-07-15 DIAGNOSIS — R53.1 WEAKNESS GENERALIZED: ICD-10-CM

## 2025-07-15 DIAGNOSIS — R26.81 UNSTEADINESS ON FEET: ICD-10-CM

## 2025-07-15 DIAGNOSIS — M25.552 PAIN OF LEFT HIP: ICD-10-CM

## 2025-07-15 PROCEDURE — 97140 MANUAL THERAPY 1/> REGIONS: CPT | Performed by: PHYSICAL THERAPIST

## 2025-07-15 PROCEDURE — 97110 THERAPEUTIC EXERCISES: CPT | Performed by: PHYSICAL THERAPIST

## 2025-07-15 PROCEDURE — G0283 ELEC STIM OTHER THAN WOUND: HCPCS | Performed by: PHYSICAL THERAPIST

## 2025-07-17 NOTE — PROGRESS NOTES
Physical Therapy Daily Treatment Note      Patient: Renate Williamson   : 1945  Diagnosis/ICD-10 Code:  Chronic bilateral low back pain with left-sided sciatica [G89.29, M54.42]   Problems Addressed this Visit    None  Visit Diagnoses         Chronic bilateral low back pain with left-sided sciatica    -  Primary      Pain of left hip          Weakness generalized          Unsteadiness on feet              Diagnoses         Codes Comments      Chronic bilateral low back pain with left-sided sciatica    -  Primary ICD-10-CM: G89.29, M54.42  ICD-9-CM: 338.29, 724.2, 724.3       Pain of left hip     ICD-10-CM: M25.552  ICD-9-CM: 719.45       Weakness generalized     ICD-10-CM: R53.1  ICD-9-CM: 780.79       Unsteadiness on feet     ICD-10-CM: R26.81  ICD-9-CM: 781.2           Referring practitioner: PATRICIO Dorantes  Date of Initial Visit: Type: THERAPY  Noted: 2024  Today's Date: 7/15/2025    VISIT#: 30    Subjective : Renate reports her L low back and leg have been hurting. States she has to lay on her heating pad in the morning when she gets out of bed due to back and leg pain.    Objective : recommend pt do gentle lumbar ROM before before getting out of bed (LTR, SKTC, PPT). She verbalized good understanding.    See Exercise, Manual, and Modality Logs for complete treatment.     Assessment/Plan : Increased pain at start of session. Good tolerance to manual techniques and ther ex with no increase in symptoms.     Progress per Plan of Care and Progress strengthening /stabilization /functional activity         Timed:         Manual Therapy:    10     mins  22176;     Therapeutic Exercise:    15     mins  48337;     Neuromuscular Damaris:        mins  86328;    Therapeutic Activity:          mins  94103;     Gait Training:           mins  86162;     Ultrasound:          mins  01370;    Ionto                                   mins   38949  Self Care                            mins   38918    Un-Timed:  Electrical  Stimulation:    15     mins  31327 ( );  Dry Needling          mins 38372/31781  Traction          mins 98331  Canalith Repos                   mins  52198  Low Eval          Mins  80325  Mod Eval          Mins  86699  High Eval                            Mins  64489  Re-Eval                               mins  56498    Timed Treatment:   25   mins   Total Treatment:     40   mins    Caroline De Jesus PT, CLT  Physical Therapist  [place] Lic # [id number]

## 2025-07-22 ENCOUNTER — TELEPHONE (OUTPATIENT)
Dept: PHYSICAL THERAPY | Facility: CLINIC | Age: 80
End: 2025-07-22

## 2025-07-22 NOTE — TELEPHONE ENCOUNTER
Caller: Renate Williamson    Relationship: Self         What was the call regarding: NOT FEELING WELL

## 2025-07-29 ENCOUNTER — TREATMENT (OUTPATIENT)
Dept: PHYSICAL THERAPY | Facility: CLINIC | Age: 80
End: 2025-07-29
Payer: MEDICARE

## 2025-07-29 DIAGNOSIS — R26.81 UNSTEADINESS ON FEET: ICD-10-CM

## 2025-07-29 DIAGNOSIS — M54.42 CHRONIC BILATERAL LOW BACK PAIN WITH LEFT-SIDED SCIATICA: Primary | ICD-10-CM

## 2025-07-29 DIAGNOSIS — G89.29 CHRONIC BILATERAL LOW BACK PAIN WITH LEFT-SIDED SCIATICA: Primary | ICD-10-CM

## 2025-07-29 DIAGNOSIS — M25.552 PAIN OF LEFT HIP: ICD-10-CM

## 2025-07-29 DIAGNOSIS — R53.1 WEAKNESS GENERALIZED: ICD-10-CM

## 2025-07-29 PROCEDURE — 97110 THERAPEUTIC EXERCISES: CPT | Performed by: PHYSICAL THERAPIST

## 2025-07-29 PROCEDURE — 97140 MANUAL THERAPY 1/> REGIONS: CPT | Performed by: PHYSICAL THERAPIST

## 2025-07-29 PROCEDURE — G0283 ELEC STIM OTHER THAN WOUND: HCPCS | Performed by: PHYSICAL THERAPIST

## 2025-07-29 NOTE — PROGRESS NOTES
Physical Therapy Daily Treatment Note      Patient: Renate Williamson   : 1945  Diagnosis/ICD-10 Code:  Chronic bilateral low back pain with left-sided sciatica [G89.29, M54.42]   Problems Addressed this Visit    None  Visit Diagnoses         Chronic bilateral low back pain with left-sided sciatica    -  Primary      Pain of left hip          Weakness generalized          Unsteadiness on feet              Diagnoses         Codes Comments      Chronic bilateral low back pain with left-sided sciatica    -  Primary ICD-10-CM: G89.29, M54.42  ICD-9-CM: 338.29, 724.2, 724.3       Pain of left hip     ICD-10-CM: M25.552  ICD-9-CM: 719.45       Weakness generalized     ICD-10-CM: R53.1  ICD-9-CM: 780.79       Unsteadiness on feet     ICD-10-CM: R26.81  ICD-9-CM: 781.2           Referring practitioner: PATRICIO Dorantes  Date of Initial Visit: Type: THERAPY  Noted: 2024  Today's Date: 2025    VISIT#: 31    Subjective : Renate reports her back has been feeling better.    Objective :     See Exercise, Manual, and Modality Logs for complete treatment.     Assessment/Plan : Decreased pain at start of session. Good tolerance to manual techniques and ther ex with no increase in symptoms. Responding well to continued core and B hip strengthening.    Progress per Plan of Care and Progress strengthening /stabilization /functional activity         Timed:         Manual Therapy:    10     mins  76702;     Therapeutic Exercise:    15     mins  52412;     Neuromuscular Damaris:        mins  97613;    Therapeutic Activity:          mins  39384;     Gait Training:           mins  23777;     Ultrasound:          mins  26421;    Ionto                                   mins   27334  Self Care                            mins   84243    Un-Timed:  Electrical Stimulation:    15     mins  10909 ( );  Dry Needling          mins /01689  Traction          mins 32674  Canalith Repos                   mins  19746  Low Eval           Mins  67298  Mod Eval          Mins  97736  High Eval                            Mins  59830  Re-Eval                               mins  89907    Timed Treatment:   25   mins   Total Treatment:     40   mins    Caroline De Jesus PT, CLT  Physical Therapist  [place] Lic # [id number]

## 2025-08-05 ENCOUNTER — TREATMENT (OUTPATIENT)
Dept: PHYSICAL THERAPY | Facility: CLINIC | Age: 80
End: 2025-08-05
Payer: MEDICARE

## 2025-08-05 DIAGNOSIS — M54.42 CHRONIC BILATERAL LOW BACK PAIN WITH LEFT-SIDED SCIATICA: Primary | ICD-10-CM

## 2025-08-05 DIAGNOSIS — G89.29 CHRONIC BILATERAL LOW BACK PAIN WITH LEFT-SIDED SCIATICA: Primary | ICD-10-CM

## 2025-08-05 DIAGNOSIS — R53.1 WEAKNESS GENERALIZED: ICD-10-CM

## 2025-08-05 DIAGNOSIS — R26.81 UNSTEADINESS ON FEET: ICD-10-CM

## 2025-08-05 DIAGNOSIS — M25.552 PAIN OF LEFT HIP: ICD-10-CM

## 2025-08-05 PROCEDURE — G0283 ELEC STIM OTHER THAN WOUND: HCPCS | Performed by: PHYSICAL THERAPIST

## 2025-08-05 PROCEDURE — 97140 MANUAL THERAPY 1/> REGIONS: CPT | Performed by: PHYSICAL THERAPIST

## 2025-08-05 PROCEDURE — 97112 NEUROMUSCULAR REEDUCATION: CPT | Performed by: PHYSICAL THERAPIST

## 2025-08-05 PROCEDURE — 97110 THERAPEUTIC EXERCISES: CPT | Performed by: PHYSICAL THERAPIST

## 2025-08-12 ENCOUNTER — TREATMENT (OUTPATIENT)
Dept: PHYSICAL THERAPY | Facility: CLINIC | Age: 80
End: 2025-08-12
Payer: MEDICARE

## 2025-08-12 DIAGNOSIS — G89.29 CHRONIC BILATERAL LOW BACK PAIN WITH LEFT-SIDED SCIATICA: Primary | ICD-10-CM

## 2025-08-12 DIAGNOSIS — R26.81 UNSTEADINESS ON FEET: ICD-10-CM

## 2025-08-12 DIAGNOSIS — M25.552 PAIN OF LEFT HIP: ICD-10-CM

## 2025-08-12 DIAGNOSIS — M54.42 CHRONIC BILATERAL LOW BACK PAIN WITH LEFT-SIDED SCIATICA: Primary | ICD-10-CM

## 2025-08-12 DIAGNOSIS — R53.1 WEAKNESS GENERALIZED: ICD-10-CM

## 2025-08-12 PROCEDURE — G0283 ELEC STIM OTHER THAN WOUND: HCPCS | Performed by: PHYSICAL THERAPIST

## 2025-08-12 PROCEDURE — 97110 THERAPEUTIC EXERCISES: CPT | Performed by: PHYSICAL THERAPIST

## 2025-08-12 PROCEDURE — 97112 NEUROMUSCULAR REEDUCATION: CPT | Performed by: PHYSICAL THERAPIST

## 2025-08-12 PROCEDURE — 97140 MANUAL THERAPY 1/> REGIONS: CPT | Performed by: PHYSICAL THERAPIST

## 2025-08-19 ENCOUNTER — TREATMENT (OUTPATIENT)
Dept: PHYSICAL THERAPY | Facility: CLINIC | Age: 80
End: 2025-08-19
Payer: MEDICARE

## 2025-08-19 DIAGNOSIS — R53.1 WEAKNESS GENERALIZED: ICD-10-CM

## 2025-08-19 DIAGNOSIS — G89.29 CHRONIC BILATERAL LOW BACK PAIN WITH LEFT-SIDED SCIATICA: Primary | ICD-10-CM

## 2025-08-19 DIAGNOSIS — M54.42 CHRONIC BILATERAL LOW BACK PAIN WITH LEFT-SIDED SCIATICA: Primary | ICD-10-CM

## 2025-08-19 DIAGNOSIS — M25.552 PAIN OF LEFT HIP: ICD-10-CM

## 2025-08-19 DIAGNOSIS — R26.81 UNSTEADINESS ON FEET: ICD-10-CM

## 2025-08-19 PROCEDURE — 97110 THERAPEUTIC EXERCISES: CPT | Performed by: PHYSICAL THERAPIST

## 2025-08-19 PROCEDURE — G0283 ELEC STIM OTHER THAN WOUND: HCPCS | Performed by: PHYSICAL THERAPIST

## 2025-08-19 PROCEDURE — 97140 MANUAL THERAPY 1/> REGIONS: CPT | Performed by: PHYSICAL THERAPIST

## 2025-08-19 PROCEDURE — 97112 NEUROMUSCULAR REEDUCATION: CPT | Performed by: PHYSICAL THERAPIST

## 2025-08-26 ENCOUNTER — TREATMENT (OUTPATIENT)
Dept: PHYSICAL THERAPY | Facility: CLINIC | Age: 80
End: 2025-08-26
Payer: MEDICARE

## 2025-08-26 DIAGNOSIS — R53.1 WEAKNESS GENERALIZED: ICD-10-CM

## 2025-08-26 DIAGNOSIS — G89.29 CHRONIC BILATERAL LOW BACK PAIN WITH LEFT-SIDED SCIATICA: Primary | ICD-10-CM

## 2025-08-26 DIAGNOSIS — M25.552 PAIN OF LEFT HIP: ICD-10-CM

## 2025-08-26 DIAGNOSIS — R26.81 UNSTEADINESS ON FEET: ICD-10-CM

## 2025-08-26 DIAGNOSIS — M54.42 CHRONIC BILATERAL LOW BACK PAIN WITH LEFT-SIDED SCIATICA: Primary | ICD-10-CM

## 2025-08-26 PROCEDURE — 97140 MANUAL THERAPY 1/> REGIONS: CPT | Performed by: PHYSICAL THERAPIST

## 2025-08-26 PROCEDURE — 97110 THERAPEUTIC EXERCISES: CPT | Performed by: PHYSICAL THERAPIST

## 2025-08-26 PROCEDURE — G0283 ELEC STIM OTHER THAN WOUND: HCPCS | Performed by: PHYSICAL THERAPIST

## 2025-08-26 PROCEDURE — 97112 NEUROMUSCULAR REEDUCATION: CPT | Performed by: PHYSICAL THERAPIST
